# Patient Record
Sex: FEMALE | Race: WHITE | NOT HISPANIC OR LATINO
[De-identification: names, ages, dates, MRNs, and addresses within clinical notes are randomized per-mention and may not be internally consistent; named-entity substitution may affect disease eponyms.]

---

## 2020-03-13 PROBLEM — Z00.00 ENCOUNTER FOR PREVENTIVE HEALTH EXAMINATION: Status: ACTIVE | Noted: 2020-03-13

## 2020-03-19 ENCOUNTER — APPOINTMENT (OUTPATIENT)
Dept: SPINE | Facility: CLINIC | Age: 55
End: 2020-03-19

## 2020-04-02 ENCOUNTER — APPOINTMENT (OUTPATIENT)
Dept: SPINE | Facility: CLINIC | Age: 55
End: 2020-04-02

## 2020-12-14 ENCOUNTER — INPATIENT (INPATIENT)
Facility: HOSPITAL | Age: 55
LOS: 3 days | Discharge: HOME | End: 2020-12-18
Attending: INTERNAL MEDICINE | Admitting: INTERNAL MEDICINE
Payer: MEDICAID

## 2020-12-14 VITALS
OXYGEN SATURATION: 97 % | WEIGHT: 279.11 LBS | SYSTOLIC BLOOD PRESSURE: 135 MMHG | RESPIRATION RATE: 20 BRPM | TEMPERATURE: 98 F | HEART RATE: 135 BPM | DIASTOLIC BLOOD PRESSURE: 78 MMHG

## 2020-12-14 DIAGNOSIS — I26.99 OTHER PULMONARY EMBOLISM WITHOUT ACUTE COR PULMONALE: ICD-10-CM

## 2020-12-14 LAB
ALBUMIN SERPL ELPH-MCNC: 3.7 G/DL — SIGNIFICANT CHANGE UP (ref 3.5–5.2)
ALP SERPL-CCNC: 76 U/L — SIGNIFICANT CHANGE UP (ref 30–115)
ALT FLD-CCNC: 25 U/L — SIGNIFICANT CHANGE UP (ref 0–41)
ANION GAP SERPL CALC-SCNC: 12 MMOL/L — SIGNIFICANT CHANGE UP (ref 7–14)
AST SERPL-CCNC: 16 U/L — SIGNIFICANT CHANGE UP (ref 0–41)
BASOPHILS # BLD AUTO: 0.03 K/UL — SIGNIFICANT CHANGE UP (ref 0–0.2)
BASOPHILS NFR BLD AUTO: 0.3 % — SIGNIFICANT CHANGE UP (ref 0–1)
BILIRUB SERPL-MCNC: 1.7 MG/DL — HIGH (ref 0.2–1.2)
BUN SERPL-MCNC: 8 MG/DL — LOW (ref 10–20)
CALCIUM SERPL-MCNC: 9 MG/DL — SIGNIFICANT CHANGE UP (ref 8.5–10.1)
CHLORIDE SERPL-SCNC: 99 MMOL/L — SIGNIFICANT CHANGE UP (ref 98–110)
CO2 SERPL-SCNC: 25 MMOL/L — SIGNIFICANT CHANGE UP (ref 17–32)
CREAT SERPL-MCNC: 0.6 MG/DL — LOW (ref 0.7–1.5)
EOSINOPHIL # BLD AUTO: 0.03 K/UL — SIGNIFICANT CHANGE UP (ref 0–0.7)
EOSINOPHIL NFR BLD AUTO: 0.3 % — SIGNIFICANT CHANGE UP (ref 0–8)
GLUCOSE SERPL-MCNC: 97 MG/DL — SIGNIFICANT CHANGE UP (ref 70–99)
HCG SERPL QL: NEGATIVE — SIGNIFICANT CHANGE UP
HCT VFR BLD CALC: 38.6 % — SIGNIFICANT CHANGE UP (ref 37–47)
HGB BLD-MCNC: 12.6 G/DL — SIGNIFICANT CHANGE UP (ref 12–16)
IMM GRANULOCYTES NFR BLD AUTO: 0.4 % — HIGH (ref 0.1–0.3)
LIDOCAIN IGE QN: 28 U/L — SIGNIFICANT CHANGE UP (ref 7–60)
LYMPHOCYTES # BLD AUTO: 0.95 K/UL — LOW (ref 1.2–3.4)
LYMPHOCYTES # BLD AUTO: 9 % — LOW (ref 20.5–51.1)
MAGNESIUM SERPL-MCNC: 1.8 MG/DL — SIGNIFICANT CHANGE UP (ref 1.8–2.4)
MCHC RBC-ENTMCNC: 30.1 PG — SIGNIFICANT CHANGE UP (ref 27–31)
MCHC RBC-ENTMCNC: 32.6 G/DL — SIGNIFICANT CHANGE UP (ref 32–37)
MCV RBC AUTO: 92.3 FL — SIGNIFICANT CHANGE UP (ref 81–99)
MONOCYTES # BLD AUTO: 0.86 K/UL — HIGH (ref 0.1–0.6)
MONOCYTES NFR BLD AUTO: 8.2 % — SIGNIFICANT CHANGE UP (ref 1.7–9.3)
NEUTROPHILS # BLD AUTO: 8.63 K/UL — HIGH (ref 1.4–6.5)
NEUTROPHILS NFR BLD AUTO: 81.8 % — HIGH (ref 42.2–75.2)
NRBC # BLD: 0 /100 WBCS — SIGNIFICANT CHANGE UP (ref 0–0)
NT-PROBNP SERPL-SCNC: 39 PG/ML — SIGNIFICANT CHANGE UP (ref 0–300)
PLATELET # BLD AUTO: 174 K/UL — SIGNIFICANT CHANGE UP (ref 130–400)
POTASSIUM SERPL-MCNC: 4 MMOL/L — SIGNIFICANT CHANGE UP (ref 3.5–5)
POTASSIUM SERPL-SCNC: 4 MMOL/L — SIGNIFICANT CHANGE UP (ref 3.5–5)
PROT SERPL-MCNC: 6.1 G/DL — SIGNIFICANT CHANGE UP (ref 6–8)
RAPID RVP RESULT: SIGNIFICANT CHANGE UP
RBC # BLD: 4.18 M/UL — LOW (ref 4.2–5.4)
RBC # FLD: 14.4 % — SIGNIFICANT CHANGE UP (ref 11.5–14.5)
SARS-COV-2 RNA SPEC QL NAA+PROBE: SIGNIFICANT CHANGE UP
SODIUM SERPL-SCNC: 136 MMOL/L — SIGNIFICANT CHANGE UP (ref 135–146)
TROPONIN T SERPL-MCNC: <0.01 NG/ML — SIGNIFICANT CHANGE UP
WBC # BLD: 10.54 K/UL — SIGNIFICANT CHANGE UP (ref 4.8–10.8)
WBC # FLD AUTO: 10.54 K/UL — SIGNIFICANT CHANGE UP (ref 4.8–10.8)

## 2020-12-14 PROCEDURE — 71046 X-RAY EXAM CHEST 2 VIEWS: CPT | Mod: 26

## 2020-12-14 PROCEDURE — 99223 1ST HOSP IP/OBS HIGH 75: CPT | Mod: AI

## 2020-12-14 PROCEDURE — 71275 CT ANGIOGRAPHY CHEST: CPT | Mod: 26

## 2020-12-14 PROCEDURE — 93010 ELECTROCARDIOGRAM REPORT: CPT

## 2020-12-14 PROCEDURE — 99285 EMERGENCY DEPT VISIT HI MDM: CPT

## 2020-12-14 RX ORDER — IBUPROFEN 200 MG
600 TABLET ORAL ONCE
Refills: 0 | Status: COMPLETED | OUTPATIENT
Start: 2020-12-14 | End: 2020-12-14

## 2020-12-14 RX ORDER — ENOXAPARIN SODIUM 100 MG/ML
120 INJECTION SUBCUTANEOUS ONCE
Refills: 0 | Status: DISCONTINUED | OUTPATIENT
Start: 2020-12-14 | End: 2020-12-14

## 2020-12-14 RX ORDER — CHLORHEXIDINE GLUCONATE 213 G/1000ML
1 SOLUTION TOPICAL
Refills: 0 | Status: DISCONTINUED | OUTPATIENT
Start: 2020-12-14 | End: 2020-12-18

## 2020-12-14 RX ORDER — SODIUM CHLORIDE 9 MG/ML
1000 INJECTION, SOLUTION INTRAVENOUS ONCE
Refills: 0 | Status: COMPLETED | OUTPATIENT
Start: 2020-12-14 | End: 2020-12-14

## 2020-12-14 RX ORDER — ENOXAPARIN SODIUM 100 MG/ML
120 INJECTION SUBCUTANEOUS EVERY 12 HOURS
Refills: 0 | Status: DISCONTINUED | OUTPATIENT
Start: 2020-12-14 | End: 2020-12-15

## 2020-12-14 RX ORDER — FAMOTIDINE 10 MG/ML
20 INJECTION INTRAVENOUS DAILY
Refills: 0 | Status: DISCONTINUED | OUTPATIENT
Start: 2020-12-14 | End: 2020-12-18

## 2020-12-14 RX ADMIN — Medication 600 MILLIGRAM(S): at 20:00

## 2020-12-14 RX ADMIN — ENOXAPARIN SODIUM 120 MILLIGRAM(S): 100 INJECTION SUBCUTANEOUS at 21:50

## 2020-12-14 RX ADMIN — SODIUM CHLORIDE 1000 MILLILITER(S): 9 INJECTION, SOLUTION INTRAVENOUS at 16:57

## 2020-12-14 RX ADMIN — Medication 600 MILLIGRAM(S): at 19:22

## 2020-12-14 NOTE — ED ADULT NURSE NOTE - CHIEF COMPLAINT QUOTE
Chest pain since yesterday. Pt was recently dc from hospital with covid and pna, tested negative for covid 5 days ago. Only complaint is chest pain. -Romain Little [phone]

## 2020-12-14 NOTE — ED PROVIDER NOTE - NS ED ROS FT
Eyes:  No visual changes, eye pain or discharge.  ENMT:  No hearing changes, pain, no sore throat or runny nose, no difficulty swallowing  Cardiac:  +chest pain   Respiratory:  +cough  GI:  No nausea, vomiting, diarrhea or abdominal pain.  :  No dysuria, frequency or burning.  MS:  No myalgia, muscle weakness, joint pain or back pain.  Neuro:  No headache or weakness.  No LOC.  Skin:  No skin rash.   Endocrine: No history of thyroid disease or diabetes.

## 2020-12-14 NOTE — H&P ADULT - ATTENDING COMMENTS
54 YO F with a PMH of obesity, GERD, and recent COVID-19 and pneumonia discharged recently from [facility] (data not in HIE) who presents to the hosptial with a c/o sudden onset SOB that started this AM. Associated with CP that is described as located over her back, sharp, and worse with deep breaths. Denies any fevers/chills, LE swelling, palpitations, or N/V/D. Of note, when pt was discharged from [facility], pt was not started on ASA or AC. In the ED, CTA-chest showed acute B/L lower lobe segmental and subsegmental pulmonary emboli w/ no right heart strain. Started on therapeutic Lovenox in the ED.     Physical exam shows obese pt in NAD. Tachycardic (110's), afebrile, not hypoxic on RA. A&Ox3. Non-focal neuro exam. Muscle strength/sensation intact. CTA B/L with no W/C/R. RRR, no M/G/R. ABD is obese soft and non-tender, normoactive BSs. LEs without swelling. No rashes. Labs and radiology as above.     B/L segmental and subsegmental pulmonary embolisms, no right strain present; provoked. Obtain official echo. Started on therapeutic Lovenox in the ED, transition to NOAC in the AM. Serial cardiac enzymes. Monitor VSs. Supplemental O2 PRN.     HX of obesity and GERD. Restart home meds. DVT PPX. Inform PCP of pt's admission to hospital. My note supersedes the residents note.

## 2020-12-14 NOTE — H&P ADULT - ASSESSMENT
56 Yo F with PMHx significant for recent COVID-19 and pneumonia discharged recently from [facility] (data not in HIE) presented with complain of shortness of breath which started acutely and was accompanied with chest pain midsternal in location and radiating to the right arm. Patient tested positive for COVID-19 on 11/15/2020 and was hospitalized for ten days, received steroids and another medication (patient is not sure if it was Remdesivir) and was sent home on home oxygen. Patient was doing well until yesterday when she felt short of breath and developed non productive cough with chest pain radiating to right arm and back. Patient is not on any anticoagulation and was not sent home on any.  Patient denied any fevers, altered bowel habits, recent travels or headaches.         #Acute bilateral lower lobe segmental and subsegmental Pulmonary Embolism post COVID-19  -CT angio chest showed acute bilateral lower lobe segmental and subsegmental pulmonary emboli with No right heart strain. Patchy bilateral ground glass opacities, likely postinfectious or postinflammatory in etiology and small right pleural effusion was seen.   -EKG showed sinus tachycardia with non specific ST changes.  -BNP and troponin were negative.  -will start on Lovenox 1mg/kg bid for now, patient will need anticoagulation on discharge  -will order echocardiogram and LE duplex  -f/u troponin in am  -pain control prn    #Hx of GERD:  -C/W Pepcid     DIET: DASH  DVT PPX  GI PPX  CHG   AIT

## 2020-12-14 NOTE — ED PROVIDER NOTE - CLINICAL SUMMARY MEDICAL DECISION MAKING FREE TEXT BOX
pt found to have acute bilat PE, lovenox given, still tachy to 130s, admit to medicine tele for further work up/treatment. no heart strain on CTA.

## 2020-12-14 NOTE — ED ADULT NURSE NOTE - OBJECTIVE STATEMENT
Patient c.o intermittent midsternal c.p that radiates to right shoulder. As per patient pain comes on when she coughs. Patient recently had covid and pneumonia but swabbed negative 5 days ago. Denies sob or fever at this time.

## 2020-12-14 NOTE — H&P ADULT - HISTORY OF PRESENT ILLNESS
56 Yo F with PMHX significant for recent COVID-19 and pneumonia discharged recently (data not in HIE) after being swabbed negative presented with complain of shortness of breath which started acutely and was accompanied with chest pain midsternal in location and radiating to the right arm.  56 Yo F with PMHx significant for recent COVID-19 and pneumonia discharged recently from [facility] (data not in HIE) presented with complain of shortness of breath which started acutely and was accompanied with chest pain midsternal in location and radiating to the right arm. Patient tested positive for COVID-19 on 11/15/2020 and was hospitalized for ten days, received steroids and another medication (patient is not sure if it was Remdesivir) and was sent home on home oxygen. Patient  was doing well until yesterday when she felt short of breath and developed non productive cough with chest pain radiating to right arm and back. Patient is not on any anticoagulation and was not sent home on any.  Patient denied any fevers, altered bowel habits, recent travels or headaches.     Vital Signs (24 Hrs):  T(C): 37.1 (12-14-20 @ 16:30), Max: 37.1 (12-14-20 @ 16:30)  HR: 120 (12-14-20 @ 16:30) (120 - 135)  BP: 139/72 (12-14-20 @ 16:30) (135/78 - 139/72)  RR: 18 (12-14-20 @ 16:30) (18 - 20)  SpO2: 98% (12-14-20 @ 16:30) (97% - 98%)    Patient was received vitally stable in ER. Blood work was significant for lymphocytopenia and elevated bilirubin. BNP and troponin were negative. CT angio chest showed acute bilateral lower lobe segmental and subsegmental pulmonary emboli with No right heart strain. Patchy bilateral ground glass opacities, likely postinfectious or postinflammatory in etiology and small right pleural effusion was seen. EKG showed sinus tachycardia with non specific ST changes. Patient was admitted for initiating anticoagulation and monitoring.

## 2020-12-14 NOTE — ED ADULT NURSE NOTE - NSIMPLEMENTINTERV_GEN_ALL_ED
Implemented All Universal Safety Interventions:  Means to call system. Call bell, personal items and telephone within reach. Instruct patient to call for assistance. Room bathroom lighting operational. Non-slip footwear when patient is off stretcher. Physically safe environment: no spills, clutter or unnecessary equipment. Stretcher in lowest position, wheels locked, appropriate side rails in place.

## 2020-12-14 NOTE — ED PROVIDER NOTE - PHYSICAL EXAMINATION
November 14, 2019      [address] [facility] [address]  Phone: [phone]  Fax: [phone]       Patient: Gricelda Mora   YOB: 2002  Date of Visit: 11/14/2019    To Whom It May Concern:    Louisa Mora  was at [facility] on 11/14/2019. She may return to school with no restrictions. If you have any questions or concerns, or if I can be of further assistance, please do not hesitate to contact me.    Sincerely,  SAUL Saleh MA      CONSTITUTIONAL: Well-developed; well-nourished; in no acute distress.   SKIN: warm, dry  HEAD: Normocephalic; atraumatic.  EYES: no conjunctival injection. PERRL.   ENT: No nasal discharge; airway clear.  NECK: Supple; non tender.  CARD: Tachycardic  RESP: No wheezes, rales or rhonchi.  ABD: soft ntnd  EXT: Normal ROM.  No clubbing, cyanosis or edema.   LYMPH: No acute cervical adenopathy.  NEURO: Alert, oriented, grossly unremarkable  PSYCH: Cooperative, appropriate.

## 2020-12-14 NOTE — ED PROVIDER NOTE - OBJECTIVE STATEMENT
55y F pmh COVID-19 presenting with chest pain that began last night. Radiating to the book. Sharp stabbing, always there. No f/c. Pt tested positive for COVID late number warranting an ICU admission. Was never admitted. Not on blood thinners. No leg swelling. Non smoker. No malignancy. No orthopnea. Has not taken anything for the pain.

## 2020-12-14 NOTE — H&P ADULT - NSHPPHYSICALEXAM_GEN_ALL_CORE
PHYSICAL EXAM:  GENERAL: NAD, well-developed  HEAD:  Atraumatic, Normocephalic  EYES: conjunctiva and sclera clear  NECK: Supple, No JVD  CHEST/LUNG: Decreased breath sounds bilaterally; No wheeze  HEART: Regular rate and rhythm;   ABDOMEN: Soft, Nontender, Nondistended; Bowel sounds present  EXTREMITIES:  2+ Peripheral Pulses, No clubbing, cyanosis, or edema  PSYCH: AAOx3  NEUROLOGY: non-focal  SKIN: No rashes or lesions

## 2020-12-14 NOTE — ED ADULT TRIAGE NOTE - CHIEF COMPLAINT QUOTE
Chest pain since yesterday. Pt was recently dc from hospital with covid and pna, tested negative for covid 5 days ago. Only complaint is chest pain. -Romain Little [phone]

## 2020-12-14 NOTE — H&P ADULT - NSHPREVIEWOFSYSTEMS_GEN_ALL_CORE
CONSTITUTIONAL: No weakness, fevers or chills, no weight loss   EYES/ENT: No visual changes;  No vertigo or throat pain   NECK: No pain or stiffness  RESPIRATORY: +cough, + shortness of breath  CARDIOVASCULAR: +chest pain  GASTROINTESTINAL: No abdominal or epigastric pain. No nausea, vomiting, or hematemesis; No diarrhea or constipation. No melena or hematochezia.  GENITOURINARY: No discharge, hematuria  NEUROLOGICAL: No numbness or weakness  All other review of systems is negative unless indicated above.

## 2020-12-14 NOTE — H&P ADULT - NSHPLABSRESULTS_GEN_ALL_CORE
.  LABS:                         12.6   10.54 )-----------( 174      ( 14 Dec 2020 17:00 )             38.6     12-14    136  |  99  |  8<L>  ----------------------------<  97  4.0   |  25  |  0.6<L>    Ca    9.0      14 Dec 2020 17:00  Mg     1.8     12-14    TPro  6.1  /  Alb  3.7  /  TBili  1.7<H>  /  DBili  x   /  AST  16  /  ALT  25  /  AlkPhos  76  12-14        Serum Pro-Brain Natriuretic Peptide: 39 pg/mL (12-14 @ 17:00)        RADIOLOGY, EKG & ADDITIONAL TESTS: Reviewed.       < from: CT Angio Chest PE Protocol w/ IV Cont (12.14.20 @ 18:14) >    IMPRESSION:    1. Acute bilateral lower lobe segmental and subsegmental pulmonary emboli. No right heart strain.    2. Patchy bilateral ground glass opacities, likely postinfectious or postinflammatory in etiology.    3. Small right pleural effusion.

## 2020-12-14 NOTE — ED PROVIDER NOTE - ATTENDING CONTRIBUTION TO CARE
55F [facility] PE 11/2020 was admitted to ICU but never intubated dc home on O2 PRN, states covid neg on wednesday, p/w acute onset sharp substernal chest pain constant radiates to back, pleuritic. was not dc home on anticoag or ASA. seen by pmd today for symptoms and found to be tachycardic so sent to ED. no sob, palp, moreno. still coughing but no fever. no le edema, le pain, immobilization, hormones, hemoptysis.     on exam, AFVSS, well sarah nad, ncat, eomi, perrla, mmm, lctab, tachycardic 120s, rr nl s1s2 no mrg, abd soft ntnd, aaox3, no focal deficits, no le edema or calf ttp,     a/p; Concern for PE, will do labs, ekg/trop, CXR, CTA r/o PE, tele monitor re-eval

## 2020-12-15 LAB
ALBUMIN SERPL ELPH-MCNC: 3.3 G/DL — LOW (ref 3.5–5.2)
ALP SERPL-CCNC: 69 U/L — SIGNIFICANT CHANGE UP (ref 30–115)
ALT FLD-CCNC: 19 U/L — SIGNIFICANT CHANGE UP (ref 0–41)
ANION GAP SERPL CALC-SCNC: 10 MMOL/L — SIGNIFICANT CHANGE UP (ref 7–14)
AST SERPL-CCNC: 13 U/L — SIGNIFICANT CHANGE UP (ref 0–41)
BASOPHILS # BLD AUTO: 0.02 K/UL — SIGNIFICANT CHANGE UP (ref 0–0.2)
BASOPHILS NFR BLD AUTO: 0.3 % — SIGNIFICANT CHANGE UP (ref 0–1)
BILIRUB SERPL-MCNC: 2.7 MG/DL — HIGH (ref 0.2–1.2)
BUN SERPL-MCNC: 7 MG/DL — LOW (ref 10–20)
CALCIUM SERPL-MCNC: 9.1 MG/DL — SIGNIFICANT CHANGE UP (ref 8.5–10.1)
CHLORIDE SERPL-SCNC: 100 MMOL/L — SIGNIFICANT CHANGE UP (ref 98–110)
CO2 SERPL-SCNC: 27 MMOL/L — SIGNIFICANT CHANGE UP (ref 17–32)
CREAT SERPL-MCNC: 0.7 MG/DL — SIGNIFICANT CHANGE UP (ref 0.7–1.5)
D DIMER BLD IA.RAPID-MCNC: 675 NG/ML DDU — HIGH (ref 0–230)
EOSINOPHIL # BLD AUTO: 0.04 K/UL — SIGNIFICANT CHANGE UP (ref 0–0.7)
EOSINOPHIL NFR BLD AUTO: 0.5 % — SIGNIFICANT CHANGE UP (ref 0–8)
GLUCOSE SERPL-MCNC: 95 MG/DL — SIGNIFICANT CHANGE UP (ref 70–99)
HCT VFR BLD CALC: 35.3 % — LOW (ref 37–47)
HGB BLD-MCNC: 11.7 G/DL — LOW (ref 12–16)
IMM GRANULOCYTES NFR BLD AUTO: 0.3 % — SIGNIFICANT CHANGE UP (ref 0.1–0.3)
INR BLD: 1.35 RATIO — HIGH (ref 0.65–1.3)
LYMPHOCYTES # BLD AUTO: 1.13 K/UL — LOW (ref 1.2–3.4)
LYMPHOCYTES # BLD AUTO: 14.9 % — LOW (ref 20.5–51.1)
MAGNESIUM SERPL-MCNC: 1.9 MG/DL — SIGNIFICANT CHANGE UP (ref 1.8–2.4)
MCHC RBC-ENTMCNC: 30.4 PG — SIGNIFICANT CHANGE UP (ref 27–31)
MCHC RBC-ENTMCNC: 33.1 G/DL — SIGNIFICANT CHANGE UP (ref 32–37)
MCV RBC AUTO: 91.7 FL — SIGNIFICANT CHANGE UP (ref 81–99)
MONOCYTES # BLD AUTO: 0.79 K/UL — HIGH (ref 0.1–0.6)
MONOCYTES NFR BLD AUTO: 10.4 % — HIGH (ref 1.7–9.3)
NEUTROPHILS # BLD AUTO: 5.59 K/UL — SIGNIFICANT CHANGE UP (ref 1.4–6.5)
NEUTROPHILS NFR BLD AUTO: 73.6 % — SIGNIFICANT CHANGE UP (ref 42.2–75.2)
NRBC # BLD: 0 /100 WBCS — SIGNIFICANT CHANGE UP (ref 0–0)
PLATELET # BLD AUTO: 154 K/UL — SIGNIFICANT CHANGE UP (ref 130–400)
POTASSIUM SERPL-MCNC: 4.3 MMOL/L — SIGNIFICANT CHANGE UP (ref 3.5–5)
POTASSIUM SERPL-SCNC: 4.3 MMOL/L — SIGNIFICANT CHANGE UP (ref 3.5–5)
PROT SERPL-MCNC: 6.1 G/DL — SIGNIFICANT CHANGE UP (ref 6–8)
PROTHROM AB SERPL-ACNC: 15.5 SEC — HIGH (ref 9.95–12.87)
RBC # BLD: 3.85 M/UL — LOW (ref 4.2–5.4)
RBC # FLD: 14.4 % — SIGNIFICANT CHANGE UP (ref 11.5–14.5)
SODIUM SERPL-SCNC: 137 MMOL/L — SIGNIFICANT CHANGE UP (ref 135–146)
TROPONIN T SERPL-MCNC: <0.01 NG/ML — SIGNIFICANT CHANGE UP
WBC # BLD: 7.59 K/UL — SIGNIFICANT CHANGE UP (ref 4.8–10.8)
WBC # FLD AUTO: 7.59 K/UL — SIGNIFICANT CHANGE UP (ref 4.8–10.8)

## 2020-12-15 PROCEDURE — 93306 TTE W/DOPPLER COMPLETE: CPT | Mod: 26

## 2020-12-15 PROCEDURE — 99233 SBSQ HOSP IP/OBS HIGH 50: CPT

## 2020-12-15 PROCEDURE — 93970 EXTREMITY STUDY: CPT | Mod: 26

## 2020-12-15 RX ORDER — FAMOTIDINE 10 MG/ML
20 INJECTION INTRAVENOUS DAILY
Refills: 0 | Status: COMPLETED | OUTPATIENT
Start: 2020-12-15 | End: 2020-12-15

## 2020-12-15 RX ORDER — CLOTRIMAZOLE AND BETAMETHASONE DIPROPIONATE 10; .5 MG/G; MG/G
1 CREAM TOPICAL
Refills: 0 | Status: DISCONTINUED | OUTPATIENT
Start: 2020-12-15 | End: 2020-12-15

## 2020-12-15 RX ORDER — APIXABAN 2.5 MG/1
1 TABLET, FILM COATED ORAL
Qty: 0 | Refills: 0 | DISCHARGE
Start: 2020-12-15 | End: 2021-01-26

## 2020-12-15 RX ORDER — ACETAMINOPHEN 500 MG
650 TABLET ORAL ONCE
Refills: 0 | Status: COMPLETED | OUTPATIENT
Start: 2020-12-15 | End: 2020-12-15

## 2020-12-15 RX ORDER — RIVAROXABAN 15 MG-20MG
15 KIT ORAL
Refills: 0 | Status: DISCONTINUED | OUTPATIENT
Start: 2020-12-15 | End: 2020-12-18

## 2020-12-15 RX ORDER — FONDAPARINUX SODIUM 2.5 MG/.5ML
1 INJECTION, SOLUTION SUBCUTANEOUS
Qty: 0 | Refills: 0 | DISCHARGE
Start: 2020-12-15 | End: 2021-01-05

## 2020-12-15 RX ORDER — INFLUENZA VIRUS VACCINE 15; 15; 15; 15 UG/.5ML; UG/.5ML; UG/.5ML; UG/.5ML
0.5 SUSPENSION INTRAMUSCULAR ONCE
Refills: 0 | Status: DISCONTINUED | OUTPATIENT
Start: 2020-12-15 | End: 2020-12-18

## 2020-12-15 RX ADMIN — Medication 1 APPLICATION(S): at 17:16

## 2020-12-15 RX ADMIN — Medication 650 MILLIGRAM(S): at 12:28

## 2020-12-15 RX ADMIN — ENOXAPARIN SODIUM 120 MILLIGRAM(S): 100 INJECTION SUBCUTANEOUS at 06:04

## 2020-12-15 RX ADMIN — Medication 600 MILLIGRAM(S): at 17:15

## 2020-12-15 RX ADMIN — RIVAROXABAN 15 MILLIGRAM(S): KIT at 17:31

## 2020-12-15 RX ADMIN — Medication 600 MILLIGRAM(S): at 06:04

## 2020-12-15 RX ADMIN — FAMOTIDINE 20 MILLIGRAM(S): 10 INJECTION INTRAVENOUS at 00:46

## 2020-12-15 RX ADMIN — FAMOTIDINE 20 MILLIGRAM(S): 10 INJECTION INTRAVENOUS at 12:44

## 2020-12-15 RX ADMIN — Medication 650 MILLIGRAM(S): at 14:15

## 2020-12-15 RX ADMIN — Medication 650 MILLIGRAM(S): at 22:27

## 2020-12-15 RX ADMIN — Medication 650 MILLIGRAM(S): at 21:27

## 2020-12-15 NOTE — PROGRESS NOTE ADULT - SUBJECTIVE AND OBJECTIVE BOX
KAREL CORTÉS  55y Female    CHIEF COMPLAINT:    Patient is a 55y old  Female who presents with a chief complaint of Shortness of breath (14 Dec 2020 20:16)      INTERVAL HPI/OVERNIGHT EVENTS:    Patient seen and examined.    ROS: All other systems are negative.    Vital Signs:    T(F): 98.2 (12-15-20 @ 04:00), Max: 99.3 (12-14-20 @ 21:26)  HR: 107 (12-15-20 @ 04:00) (87 - 135)  BP: 154/77 (12-15-20 @ 04:00) (94/52 - 154/77)  RR: 18 (12-15-20 @ 04:00) (18 - 20)  SpO2: 98% (12-15-20 @ 01:36) (96% - 98%)  I&O's Summary    Daily Height in cm: 180.34 (15 Dec 2020 04:00)    Daily   CAPILLARY BLOOD GLUCOSE          PHYSICAL EXAM:    GENERAL:  NAD  SKIN: No rashes or lesions  HENT: Atrumatic. Normocephalic. PERRL. Moist membranes.  NECK: Supple, No JVD. No lymphadenopathy.  PULMONARY: CTA B/L. No wheezing. No rales  CVS: Normal S1, S2. Rate and Rythm are regular. No murmurs.  ABDOMEN/GI: Soft, Nontender, Nondistended; BS present  EXTREMITIES: Peripheral pulses intact. No edema B/L LE.  NEUROLOGIC:  No motor or sensory deficit.  PSYCH: Alert & oriented x 3    Consultant(s) Notes Reviewed:  [x ] YES  [ ] NO  Care Discussed with Consultants/Other Providers [ x] YES  [ ] NO    EKG reviewed  Telemetry reviewed    LABS:                        12.6   10.54 )-----------( 174      ( 14 Dec 2020 17:00 )             38.6     12-14    136  |  99  |  8<L>  ----------------------------<  97  4.0   |  25  |  0.6<L>    Ca    9.0      14 Dec 2020 17:00  Mg     1.8     12-14    TPro  6.1  /  Alb  3.7  /  TBili  1.7<H>  /  DBili  x   /  AST  16  /  ALT  25  /  AlkPhos  76  12-14      Serum Pro-Brain Natriuretic Peptide: 39 pg/mL (12-14-20 @ 17:00)    Trop <0.01, CKMB --, CK --, 12-14-20 @ 17:00        RADIOLOGY & ADDITIONAL TESTS:    < from: CT Angio Chest PE Protocol w/ IV Cont (12.14.20 @ 18:14) >  IMPRESSION:    1. Acute bilateral lower lobe segmental and subsegmental pulmonary emboli. No right heart strain.    2. Patchy bilateral ground glass opacities, likely postinfectious or postinflammatory in etiology.    3. Small right pleural effusion.      < end of copied text >    Imaging or report Personally Reviewed:  [ ] YES  [ ] NO    Medications:  Standing  chlorhexidine 4% Liquid 1 Application(s) Topical <User Schedule>  enoxaparin Injectable 120 milliGRAM(s) SubCutaneous every 12 hours  famotidine    Tablet 20 milliGRAM(s) Oral daily  guaiFENesin  milliGRAM(s) Oral every 12 hours  influenza   Vaccine 0.5 milliLiter(s) IntraMuscular once    PRN Meds      Case discussed with resident    Care discussed with pt/family           KAREL CORTÉS  55y Female    CHIEF COMPLAINT:    Patient is a 55y old  Female who presents with a chief complaint of Shortness of breath (14 Dec 2020 20:16)      INTERVAL HPI/OVERNIGHT EVENTS:    Patient seen and examined. Spiking temp. C/O R sided back pain which gets worse on coughing. Also having dry cough and 100.9 temp. No sob.     ROS: All other systems are negative.    Vital Signs:    T(F): 98.2 (12-15-20 @ 04:00), Max: 99.3 (12-14-20 @ 21:26)  HR: 107 (12-15-20 @ 04:00) (87 - 135)  BP: 154/77 (12-15-20 @ 04:00) (94/52 - 154/77)  RR: 18 (12-15-20 @ 04:00) (18 - 20)  SpO2: 98% (12-15-20 @ 01:36) (96% - 98%)  I&O's Summary    Daily Height in cm: 180.34 (15 Dec 2020 04:00)    Daily   CAPILLARY BLOOD GLUCOSE          PHYSICAL EXAM:    GENERAL:  NAD  SKIN: No rashes or lesions  HENT: Atraumatic Normocephalic. PERRL. Moist membranes.  NECK: Supple, No JVD. No lymphadenopathy.  PULMONARY: CTA B/L. No wheezing. No rales  CVS: Normal S1, S2. Rate and Rhythm are regular. No murmurs.  ABDOMEN/GI: Soft, Nontender, Nondistended; BS present  EXTREMITIES: Peripheral pulses intact. No edema B/L LE.  NEUROLOGIC:  No motor or sensory deficit.  PSYCH: Alert & oriented x 3    Consultant(s) Notes Reviewed:  [x ] YES  [ ] NO  Care Discussed with Consultants/Other Providers [ x] YES  [ ] NO    EKG reviewed  Telemetry reviewed    LABS:                        12.6   10.54 )-----------( 174      ( 14 Dec 2020 17:00 )             38.6     12-14    136  |  99  |  8<L>  ----------------------------<  97  4.0   |  25  |  0.6<L>    Ca    9.0      14 Dec 2020 17:00  Mg     1.8     12-14    TPro  6.1  /  Alb  3.7  /  TBili  1.7<H>  /  DBili  x   /  AST  16  /  ALT  25  /  AlkPhos  76  12-14      Serum Pro-Brain Natriuretic Peptide: 39 pg/mL (12-14-20 @ 17:00)    Trop <0.01, CKMB --, CK --, 12-14-20 @ 17:00        RADIOLOGY & ADDITIONAL TESTS:    < from: CT Angio Chest PE Protocol w/ IV Cont (12.14.20 @ 18:14) >  IMPRESSION:    1. Acute bilateral lower lobe segmental and subsegmental pulmonary emboli. No right heart strain.    2. Patchy bilateral ground glass opacities, likely postinfectious or postinflammatory in etiology.    3. Small right pleural effusion.      < end of copied text >  e< from: TTE Echo Complete w/o Contrast w/ Doppler (12.15.20 @ 08:27) >    Summary:   1. LV Ejection Fraction by Saucedo's Method with a biplane EF of 58 %.   2. Normal left ventricular size and wall thicknesses, with normal systolic and diastolic function.   3. Normal left atrial size.   4. Normal right atrial size.   5. No evidence of mitral valve regurgitation.   6. Mild tricuspid regurgitation.   7. LA volume Index is 13.4 ml/m² ml/m2.   8. Peak transaortic gradient equals 10.0 mmHg, mean transaortic gradient equals 5.0 mmHg, the calculated aortic valve area equals 2.28 cm² by the continuity equation consistent with mild aortic stenosis.    < end of copied text >    Imaging or report Personally Reviewed:  [ ] YES  [ ] NO    Medications:  Standing  chlorhexidine 4% Liquid 1 Application(s) Topical <User Schedule>  enoxaparin Injectable 120 milliGRAM(s) SubCutaneous every 12 hours  famotidine    Tablet 20 milliGRAM(s) Oral daily  guaiFENesin  milliGRAM(s) Oral every 12 hours  influenza   Vaccine 0.5 milliLiter(s) IntraMuscular once    PRN Meds      Case discussed with resident    Care discussed with pt/family

## 2020-12-15 NOTE — PROGRESS NOTE ADULT - ASSESSMENT
56 Yo F with PMHx significant for recent COVID-19 and pneumonia discharged recently from [facility] (data not in HIE) presented with complain of shortness of breath which started acutely and was accompanied with chest pain midsternal in location and radiating to the right arm. Patient tested positive for COVID-19 on 11/15/2020 and was hospitalized for ten days, received steroids and another medication (patient is not sure if it was Remdesivir) and was sent home on home oxygen. Patient was doing well until yesterday when she felt short of breath and developed non productive cough with chest pain radiating to right arm and back. Patient is not on any anticoagulation and was not sent home on any.  Patient denied any fevers, altered bowel habits, recent travels or headaches.     #Acute bilateral lower lobe segmental and subsegmental Pulmonary Embolism post COVID-19  -CT angio chest showed acute bilateral lower lobe segmental and subsegmental pulmonary emboli with No right heart strain. Patchy bilateral ground glass opacities, likely postinfectious or postinflammatory in etiology and small right pleural effusion was seen.   -EKG showed sinus tachycardia with non specific ST changes.  -BNP and troponin were negative.  -will start on Lovenox 1mg/kg bid for now, patient will need anticoagulation on discharge  -will order echocardiogram and LE duplex  -f/u troponin in am  -pain control prn    #Hx of GERD:  -C/W Pepcid     #Right Breast Rash #Fungal Infection on admission #Fever likely secondary to rash  - Took Clotrimazole cream 1% at home for relief  - Restart in hospital   - Wash with water and wet-->dry cloth    DIET: DASH  DVT PPX  GI PPX  CHG   AIT   56 Yo F with PMHx significant for recent COVID-19 and pneumonia discharged recently from [facility] (data not in HIE) presented with complain of shortness of breath which started acutely and was accompanied with chest pain midsternal in location and radiating to the right arm. Patient tested positive for COVID-19 on 11/15/2020 and was hospitalized for ten days, received steroids and another medication (patient is not sure if it was Remdesivir) and was sent home on home oxygen. Patient was doing well until yesterday when she felt short of breath and developed non productive cough with chest pain radiating to right arm and back. Patient is not on any anticoagulation and was not sent home on any.  Patient denied any fevers, altered bowel habits, recent travels or headaches.     #Acute bilateral lower lobe segmental and subsegmental Pulmonary Embolism post COVID-19  -CT angio chest showed acute bilateral lower lobe segmental and subsegmental pulmonary emboli with No right heart strain. Patchy bilateral ground glass opacities, likely postinfectious or postinflammatory in etiology and small right pleural effusion was seen.   -EKG showed sinus tachycardia with non specific ST changes.  -BNP and troponin were negative.  -will start on Lovenox 1mg/kg bid for now, patient will need anticoagulation on discharge  -will order echocardiogram and LE duplex  -f/u troponin in am  -pain control prn    #Hx of GERD:  -C/W Pepcid     #Right Breast Rash #Fungal Infection on admission   - Took Clotrimazole cream 1% at home for relief  - Restart in hospital   - Wash with water and wet-->dry cloth  - Blood cultures sent    #Fever secondary to PE vs Infection  -Prolonged hospital stay s/p covid infection at [facility]   -Possible nosocomial infection  -Fever yesterday to 101.7F  -Blood cultures sent last night  -f/u blood cultures, procal      DIET: DASH  DVT PPX  GI PPX  CHG   AIT  Dispo: Acute

## 2020-12-15 NOTE — PROGRESS NOTE ADULT - SUBJECTIVE AND OBJECTIVE BOX
Ptn seen and examined at bedside. No acute events overnight. Denies any ongoing chest pain/ SOB / discomfort. Complaining of R-breast rash, present since before admission treated at home by ptn's daughter. Itchy, red rash ptn describes as slowly improving over the last 1 week. Otherwise no complaints.      PAST MEDICAL & SURGICAL HISTORY  COVID-19      ALLERGIES:  Allergy Status Unknown    MEDICATIONS:  STANDING MEDICATIONS  chlorhexidine 4% Liquid 1 Application(s) Topical <User Schedule>  clotrimazole 1% Cream 1 Application(s) Topical two times a day  famotidine    Tablet 20 milliGRAM(s) Oral daily  guaiFENesin  milliGRAM(s) Oral every 12 hours  influenza   Vaccine 0.5 milliLiter(s) IntraMuscular once  rivaroxaban 15 milliGRAM(s) Oral two times a day with meals    PRN MEDICATIONS    VITALS:   T(F): 100.9  HR: 121  BP: 125/58  RR: 18  SpO2: 98%    LABS:                        11.7   7.59  )-----------( 154      ( 15 Dec 2020 04:30 )             35.3     12-15    137  |  100  |  7<L>  ----------------------------<  95  4.3   |  27  |  0.7    Ca    9.1      15 Dec 2020 04:30  Mg     1.9     12-15    TPro  6.1  /  Alb  3.3<L>  /  TBili  2.7<H>  /  DBili  x   /  AST  13  /  ALT  19  /  AlkPhos  69  12-15    PT/INR - ( 15 Dec 2020 04:30 )   PT: 15.50 sec;   INR: 1.35 ratio               Troponin T, Serum: <0.01 ng/mL (12-15-20 @ 04:30)  Troponin T, Serum: <0.01 ng/mL (12-14-20 @ 17:00)      CARDIAC MARKERS ( 15 Dec 2020 04:30 )  x     / <0.01 ng/mL / x     / x     / x      CARDIAC MARKERS ( 14 Dec 2020 17:00 )  x     / <0.01 ng/mL / x     / x     / x          RADIOLOGY:< from: CT Angio Chest PE Protocol w/ IV Cont (12.14.20 @ 18:14) >    EXAM:  CT ANGIO CHEST PE PROTOCOL IC            PROCEDURE DATE:  12/14/2020            INTERPRETATION:  CLINICAL INDICATION: Pulmonary embolism    TECHNIQUE:  CTA of the thorax was performed after administration of contrast per the PE protocol. Sagittal and coronal reformats were performed as well as 3D reconstructions.  Intravenous contrast: 100 cc of Optiray 320    COMPARISON: None.    INTERPRETATION:    PULMONARY ARTERIES: Acute bilateral lower lobe segmental and subsegmental pulmonary emboli.    AIRWAYS/LUNGS/PLEURA: The central tracheobronchial tree is patent.  Patchy bilateral ground glass opacities. Right lung base atelectasis. There is a small right pleural effusion. There is no pneumothorax.    MEDIASTINUM: There are no enlarged mediastinal, hilar or axillary lymph nodes. The visualized portion of the thyroid gland is unremarkable.    HEART AND VESSELS: The heart is normal in size. No right heart strain. Normal size of the thoracic aorta There is no pericardial effusion.    UPPER ABDOMEN: Images of the upper abdomen demonstrate no abnormality.    BONES AND SOFT TISSUES: There are degenerative changes of the spine.  The soft tissues are unremarkable.    TUBES AND LINES: None.    IMPRESSION:    1. Acute bilateral lower lobe segmental and subsegmental pulmonary emboli. No right heart strain.    2. Patchy bilateral ground glass opacities, likely postinfectious or postinflammatory in etiology.    3. Small right pleural effusion.        Spoke with CAIT SNYDER on 12/14/2020 6:34PM with readback.              MARTHA SHIN MD; Attending Radiologist  This document has been electronically signed. Dec 14 2020  6:36PM    < end of copied text >      PHYSICAL EXAM:  GEN: No acute distress obese female  LUNGS: Clear to auscultation bilaterally, no decreased breath sounds at bases  HEART: Regular, no murmurs/ rubs. No JVD.  CHEST: R-sided breast rash extending from inferior areola to Right upper abdominal quadrant, no discharge. No tenderness to palpation.  ABD: Soft, non-tender, non-distended.  EXT: NC/NC/NE/2+PP/SALAZAR/Skin Intact.   NEURO: AAOX3

## 2020-12-15 NOTE — PROGRESS NOTE ADULT - ASSESSMENT
56 Yo F with PMHx significant for recent COVID-19 and pneumonia discharged recently from [facility] (data not in HIE) presented with complain of shortness of breath which started acutely and was accompanied with chest pain midsternal in location and radiating to the right arm. Patient tested positive for COVID-19 on 11/15/2020 and was hospitalized for ten days 56 Yo F with PMHx significant for recent COVID-19 and pneumonia discharged recently from [facility] (data not in HIE) presented with complain of shortness of breath which started acutely and was accompanied with chest pain midsternal in location and radiating to the right arm. Patient tested positive for COVID-19 on 11/15/2020 and was hospitalized for ten days      Acute B/L PE post covid  Obesity  Fever.          PLAN:    ·	Can switch her to Xarelto 15 mg po q 12h for 3 weeks, then 20 mg po daily  ·	ECHO showed EF is 58%. No RV strain  ·	Pulmonary eval  ·	Check procalcitonin  ·	Check venous doppler of LE  ·	Dietary eval

## 2020-12-16 ENCOUNTER — TRANSCRIPTION ENCOUNTER (OUTPATIENT)
Age: 55
End: 2020-12-16

## 2020-12-16 LAB
ANION GAP SERPL CALC-SCNC: 10 MMOL/L — SIGNIFICANT CHANGE UP (ref 7–14)
APPEARANCE UR: CLEAR — SIGNIFICANT CHANGE UP
BACTERIA # UR AUTO: ABNORMAL
BASOPHILS # BLD AUTO: 0.02 K/UL — SIGNIFICANT CHANGE UP (ref 0–0.2)
BASOPHILS NFR BLD AUTO: 0.3 % — SIGNIFICANT CHANGE UP (ref 0–1)
BILIRUB UR-MCNC: NEGATIVE — SIGNIFICANT CHANGE UP
BUN SERPL-MCNC: 9 MG/DL — LOW (ref 10–20)
CALCIUM SERPL-MCNC: 8.8 MG/DL — SIGNIFICANT CHANGE UP (ref 8.5–10.1)
CHLORIDE SERPL-SCNC: 100 MMOL/L — SIGNIFICANT CHANGE UP (ref 98–110)
CO2 SERPL-SCNC: 26 MMOL/L — SIGNIFICANT CHANGE UP (ref 17–32)
COLOR SPEC: YELLOW — SIGNIFICANT CHANGE UP
CREAT SERPL-MCNC: 0.8 MG/DL — SIGNIFICANT CHANGE UP (ref 0.7–1.5)
DIFF PNL FLD: SIGNIFICANT CHANGE UP
EOSINOPHIL # BLD AUTO: 0.04 K/UL — SIGNIFICANT CHANGE UP (ref 0–0.7)
EOSINOPHIL NFR BLD AUTO: 0.5 % — SIGNIFICANT CHANGE UP (ref 0–8)
EPI CELLS # UR: 2 /HPF — SIGNIFICANT CHANGE UP (ref 0–5)
GLUCOSE SERPL-MCNC: 139 MG/DL — HIGH (ref 70–99)
GLUCOSE UR QL: NEGATIVE — SIGNIFICANT CHANGE UP
HCT VFR BLD CALC: 34.5 % — LOW (ref 37–47)
HGB BLD-MCNC: 11.2 G/DL — LOW (ref 12–16)
HYALINE CASTS # UR AUTO: 1 /LPF — SIGNIFICANT CHANGE UP (ref 0–7)
IMM GRANULOCYTES NFR BLD AUTO: 0.4 % — HIGH (ref 0.1–0.3)
KETONES UR-MCNC: NEGATIVE — SIGNIFICANT CHANGE UP
LEUKOCYTE ESTERASE UR-ACNC: ABNORMAL
LYMPHOCYTES # BLD AUTO: 1.23 K/UL — SIGNIFICANT CHANGE UP (ref 1.2–3.4)
LYMPHOCYTES # BLD AUTO: 15.6 % — LOW (ref 20.5–51.1)
MAGNESIUM SERPL-MCNC: 1.8 MG/DL — SIGNIFICANT CHANGE UP (ref 1.8–2.4)
MCHC RBC-ENTMCNC: 30.1 PG — SIGNIFICANT CHANGE UP (ref 27–31)
MCHC RBC-ENTMCNC: 32.5 G/DL — SIGNIFICANT CHANGE UP (ref 32–37)
MCV RBC AUTO: 92.7 FL — SIGNIFICANT CHANGE UP (ref 81–99)
MONOCYTES # BLD AUTO: 0.56 K/UL — SIGNIFICANT CHANGE UP (ref 0.1–0.6)
MONOCYTES NFR BLD AUTO: 7.1 % — SIGNIFICANT CHANGE UP (ref 1.7–9.3)
MRSA PCR RESULT.: NEGATIVE — SIGNIFICANT CHANGE UP
NEUTROPHILS # BLD AUTO: 6 K/UL — SIGNIFICANT CHANGE UP (ref 1.4–6.5)
NEUTROPHILS NFR BLD AUTO: 76.1 % — HIGH (ref 42.2–75.2)
NITRITE UR-MCNC: POSITIVE
NRBC # BLD: 0 /100 WBCS — SIGNIFICANT CHANGE UP (ref 0–0)
PH UR: 6.5 — SIGNIFICANT CHANGE UP (ref 5–8)
PLATELET # BLD AUTO: 203 K/UL — SIGNIFICANT CHANGE UP (ref 130–400)
POTASSIUM SERPL-MCNC: 4.1 MMOL/L — SIGNIFICANT CHANGE UP (ref 3.5–5)
POTASSIUM SERPL-SCNC: 4.1 MMOL/L — SIGNIFICANT CHANGE UP (ref 3.5–5)
PROCALCITONIN SERPL-MCNC: 0.33 NG/ML — HIGH (ref 0.02–0.1)
PROT UR-MCNC: SIGNIFICANT CHANGE UP
RBC # BLD: 3.72 M/UL — LOW (ref 4.2–5.4)
RBC # FLD: 14.2 % — SIGNIFICANT CHANGE UP (ref 11.5–14.5)
RBC CASTS # UR COMP ASSIST: 2 /HPF — SIGNIFICANT CHANGE UP (ref 0–4)
SODIUM SERPL-SCNC: 136 MMOL/L — SIGNIFICANT CHANGE UP (ref 135–146)
SP GR SPEC: 1.01 — SIGNIFICANT CHANGE UP (ref 1.01–1.03)
UROBILINOGEN FLD QL: SIGNIFICANT CHANGE UP
WBC # BLD: 7.88 K/UL — SIGNIFICANT CHANGE UP (ref 4.8–10.8)
WBC # FLD AUTO: 7.88 K/UL — SIGNIFICANT CHANGE UP (ref 4.8–10.8)
WBC UR QL: 16 /HPF — HIGH (ref 0–5)

## 2020-12-16 PROCEDURE — 93010 ELECTROCARDIOGRAM REPORT: CPT

## 2020-12-16 PROCEDURE — 71045 X-RAY EXAM CHEST 1 VIEW: CPT | Mod: 26

## 2020-12-16 PROCEDURE — 99233 SBSQ HOSP IP/OBS HIGH 50: CPT

## 2020-12-16 RX ORDER — FONDAPARINUX SODIUM 2.5 MG/.5ML
1 INJECTION, SOLUTION SUBCUTANEOUS
Qty: 42 | Refills: 0
Start: 2020-12-16

## 2020-12-16 RX ORDER — FONDAPARINUX SODIUM 2.5 MG/.5ML
1 INJECTION, SOLUTION SUBCUTANEOUS
Qty: 2 | Refills: 0
Start: 2020-12-16

## 2020-12-16 RX ORDER — ACETAMINOPHEN 500 MG
650 TABLET ORAL EVERY 6 HOURS
Refills: 0 | Status: DISCONTINUED | OUTPATIENT
Start: 2020-12-16 | End: 2020-12-18

## 2020-12-16 RX ORDER — VANCOMYCIN HCL 1 G
1250 VIAL (EA) INTRAVENOUS EVERY 12 HOURS
Refills: 0 | Status: DISCONTINUED | OUTPATIENT
Start: 2020-12-16 | End: 2020-12-17

## 2020-12-16 RX ORDER — METOPROLOL TARTRATE 50 MG
5 TABLET ORAL ONCE
Refills: 0 | Status: DISCONTINUED | OUTPATIENT
Start: 2020-12-16 | End: 2020-12-16

## 2020-12-16 RX ORDER — CEFEPIME 1 G/1
1000 INJECTION, POWDER, FOR SOLUTION INTRAMUSCULAR; INTRAVENOUS EVERY 8 HOURS
Refills: 0 | Status: DISCONTINUED | OUTPATIENT
Start: 2020-12-16 | End: 2020-12-17

## 2020-12-16 RX ADMIN — Medication 600 MILLIGRAM(S): at 17:09

## 2020-12-16 RX ADMIN — RIVAROXABAN 15 MILLIGRAM(S): KIT at 17:09

## 2020-12-16 RX ADMIN — Medication 650 MILLIGRAM(S): at 17:21

## 2020-12-16 RX ADMIN — Medication 1 APPLICATION(S): at 17:10

## 2020-12-16 RX ADMIN — CEFEPIME 100 MILLIGRAM(S): 1 INJECTION, POWDER, FOR SOLUTION INTRAMUSCULAR; INTRAVENOUS at 21:22

## 2020-12-16 RX ADMIN — Medication 600 MILLIGRAM(S): at 05:36

## 2020-12-16 RX ADMIN — CEFEPIME 100 MILLIGRAM(S): 1 INJECTION, POWDER, FOR SOLUTION INTRAMUSCULAR; INTRAVENOUS at 16:24

## 2020-12-16 RX ADMIN — FAMOTIDINE 20 MILLIGRAM(S): 10 INJECTION INTRAVENOUS at 10:53

## 2020-12-16 RX ADMIN — Medication 1 APPLICATION(S): at 05:36

## 2020-12-16 RX ADMIN — Medication 650 MILLIGRAM(S): at 20:01

## 2020-12-16 RX ADMIN — RIVAROXABAN 15 MILLIGRAM(S): KIT at 08:55

## 2020-12-16 RX ADMIN — Medication 166.67 MILLIGRAM(S): at 16:24

## 2020-12-16 RX ADMIN — CHLORHEXIDINE GLUCONATE 1 APPLICATION(S): 213 SOLUTION TOPICAL at 05:36

## 2020-12-16 RX ADMIN — Medication 650 MILLIGRAM(S): at 08:54

## 2020-12-16 RX ADMIN — Medication 650 MILLIGRAM(S): at 11:40

## 2020-12-16 NOTE — DISCHARGE NOTE PROVIDER - NSDCMRMEDTOKEN_GEN_ALL_CORE_FT
clotrimazole 1% topical cream: 1 application topically 2 times a day  Mucinex 600 mg oral tablet, extended release: 1 tab(s) orally every 12 hours  Pepcid 20 mg oral tablet: 1 tab(s) orally once a day  Xarelto 15 mg oral tablet: 1 tab(s) orally 2 times a day, once with breakfast and once with dinner for 3 weeks.  Xarelto 20 mg oral tablet: 1 tab(s) orally once a day    clotrimazole 1% topical cream: 1 application topically 2 times a day  loratadine 10 mg oral tablet: 1 tab(s) orally once a day for three days.   Mucinex 600 mg oral tablet, extended release: 1 tab(s) orally every 12 hours  Pepcid 20 mg oral tablet: 1 tab(s) orally once a day  Xarelto 15 mg oral tablet: 1 tab(s) orally 2 times a day, once with breakfast and once with dinner for 3 weeks until 1/5/2021  Xarelto 20 mg oral tablet: 1 tab(s) orally once a day

## 2020-12-16 NOTE — DISCHARGE NOTE PROVIDER - NSFOLLOWUPCLINICS_GEN_ALL_ED_FT
[facility]  Pulmonary Rehab  [address] [place] [address]  Phone: [phone]  Fax:   Follow Up Time: 1 week

## 2020-12-16 NOTE — CONSULT NOTE ADULT - ASSESSMENT
impression:      Acute PE sPESI 1(intermediate risk) major transient risk factor(recent hospitalisation/COVID).  recent SARS-COV-2 penumonia requiring hospitalisation.    Plan:    Continue xarelto for 3 months  will need hypercoag workup(family hx of VTE)  Age appropriate malignancy work-up  F/u as outpt in 4-6 weeks with dr martinez  Check pulse ox on ambulation(patient do have oxygen at home)  HOB elevation  recall as needed impression:      Acute PE sPESI 1(intermediate risk) major transient risk factor(recent hospitalisation/COVID).  recent SARS-COV-2 penumonia requiring hospitalisation.    Plan:    Continue xarelto for 3 months  will need hypercoag workup(family hx of VTE)  Age appropriate malignancy work-up  F/u as outpt in 4 -6 weeks   HOB elevation  recall as needed

## 2020-12-16 NOTE — DISCHARGE NOTE PROVIDER - CARE PROVIDER_API CALL
Luciano Baliey  [id number]  [address] [place] [address]  Phone: ([phone]  Fax: ()-  Established Patient  Follow Up Time: 1 week   Luciano Bailey  [id number]  [address] [place] [address]  Phone: ()-  Fax: ()-  Established Patient  Follow Up Time: 1 week    Bright Araiza)  Critical Care Medicine; Pulmonary Disease; Sleep Medicine  [address] [place] [address]  Phone: [phone]  Fax: [phone]  Established Patient  Follow Up Time: 1 week

## 2020-12-16 NOTE — DISCHARGE NOTE PROVIDER - INSTRUCTIONS
Eat more vegetables and fruits.  Swap refined grains for whole grains.  Choose fat-free or low-fat dairy products.  Choose lean protein sources like fish, poultry and beans.  Cook with vegetable oils.  Limit your intake of foods high in added sugars, like soda and candy.

## 2020-12-16 NOTE — DISCHARGE NOTE PROVIDER - CARE PROVIDERS DIRECT ADDRESSES
,[email or web address]_Unknown

## 2020-12-16 NOTE — DISCHARGE NOTE PROVIDER - HOSPITAL COURSE
56 Yo F with PMHx significant for recent COVID-19 and pneumonia discharged recently from [facility] (data not in HIE) presented with complain of shortness of breath which started acutely and was accompanied with chest pain midsternal in location and radiating to the right arm. Patient tested positive for COVID-19 on 11/15/2020 and was hospitalized for ten days, received steroids and another medication (patient is not sure if it was Remdesivir) and was sent home on home oxygen. Patient  was doing well until yesterday when she felt short of breath and developed non productive cough with chest pain radiating to right arm and back. Patient is not on any anticoagulation and was not sent home on any.  Patient denied any fevers, altered bowel habits, recent travels or headaches.     Patient was received vitally stable in ER. Blood work was significant for lymphocytopenia and elevated bilirubin. BNP and troponin were negative. CT angio chest showed acute bilateral lower lobe segmental and subsegmental pulmonary emboli with No right heart strain. Patchy bilateral ground glass opacities, likely postinfectious or postinflammatory in etiology and small right pleural effusion was seen. EKG showed sinus tachycardia with non specific ST changes. Patient was admitted for initiating anticoagulation and monitoring. By hospital day 2, ptn continued to develop intermittent fevers and pulmonary team was consulted. Patient had concurrent R-lower breast fungal skin infection, but due to recent PE cause of fever was unknown. Blood cultures were sent for w/u possible nosocomial infection (recent hospitalizqation for COVID).        56 Yo F with PMHx significant for recent COVID-19 and pneumonia discharged recently from [facility] (data not in HIE) presented with complain of shortness of breath which started acutely and was accompanied with chest pain midsternal in location and radiating to the right arm. Patient tested positive for COVID-19 on 11/15/2020 and was hospitalized for ten days, received steroids and another medication (patient is not sure if it was Remdesivir) and was sent home on home oxygen. Patient  was doing well until yesterday when she felt short of breath and developed non productive cough with chest pain radiating to right arm and back. Patient is not on any anticoagulation and was not sent home on any.  Patient denied any fevers, altered bowel habits, recent travels or headaches.     Patient was received vitally stable in ER. Blood work was significant for lymphocytopenia and elevated bilirubin. BNP and troponin were negative. CT angio chest showed acute bilateral lower lobe segmental and subsegmental pulmonary emboli with No right heart strain. Patchy bilateral ground glass opacities, likely postinfectious or postinflammatory in etiology and small right pleural effusion was seen. EKG showed sinus tachycardia with non specific ST changes. Patient was admitted for initiating anticoagulation and monitoring. By hospital day 2, ptn continued to develop intermittent fevers and pulmonary team was consulted. Patient had concurrent R-lower breast fungal skin infection, but due to recent PE cause of fever was unknown. Blood cultures were sent for w/u possible nosocomial infection (recent hospitalizqation for COVID). Returned negaive. Patient cleared for discharge by pulm and medial teams. Can follow up with pulm team outpatient in 4-6 weeks. Will continue on Xarelto on discharge, 3 week loading dose followed by 15mg per day maintenance dose.

## 2020-12-16 NOTE — DISCHARGE NOTE PROVIDER - NSDCCPCAREPLAN_GEN_ALL_CORE_FT
PRINCIPAL DISCHARGE DIAGNOSIS  Diagnosis: Pulmonary emboli  Assessment and Plan of Treatment: During this hospitalization, you were diagnosed with a pulmonary embolsim. In your case, you have a  deep vein thrombosis (DVT) which is a blood clot in a large vein deep in a leg, arm, or elsewhere in the body. The clot can separate from the vein, travel to the lungs and cut off blood flow. This is a pulmonary embolism (PE). Pulmonary embolism is very serious. Both the prevention and the treatment are similar for DVT and PE.   To help prevent more blood clots from forming, please see your primary care doctor within one week of discharge, and please take your medicines exactly as instructed. Don’t skip doses. You have been prescribed a medication to thin the blood, so that more clots do not form.  Have all lab tests as recommended. This is very important when you take medicines to prevent blood clots. Make sure you stay active and walk for at least 30 minutes every day. When sitting for long periods of time, move your knees, ankles, feet, and toes.    If you smoke, get help to quit. Stay at a healthy weight. Try to exercise at least 30 minutes on most days. Before starting an exercise program, talk with your primary care provider. When traveling by car, make frequent stops to get up and move around. On long airplane rides, get up and move around when possible. If you can’t get up, wiggle your toes, move your ankles and tighten your calves to keep your blood moving.  Seek immediate medical care if you have pain, swelling, and redness in your leg, arm, or other body area. These symptoms may mean another blood clot. Also call your healthcare provider if you have signs and symptoms of bleeding, like blood in your urine, bleeding with bowel movements, or bleeding from the nose, gums, a cut, or vagina. Call 911 if you have symptoms of a blood clot in the lungs including: Chest pain, trouble breathing, coughing blood, fast heartbeat, heavy or uncontrolled bleeding.

## 2020-12-16 NOTE — PROGRESS NOTE ADULT - SUBJECTIVE AND OBJECTIVE BOX
KAREL CORTÉS 55y Female  MRN#: [id number]       SUBJECTIVE  Patient is a 55y old Female who presents with a chief complaint of Shortness of breath (15 Dec 2020 16:38)  Currently admitted to medicine with the primary diagnosis of bilateral pulmonary emboli secondary to COVID infection 11/15. Pt seen and examined at bedside. No acute events overnight. Denies any ongoing chest pain/ SOB / discomfort. She was started on lovenox 1mg/kg SQ yesterday and switched to xarelto 15mg PO BID for 3 weeks today. She developed a fever yesterday afternoon, Tmax 101.7, now 100.3 after tylenol. Complaining of R-breast rash, present since before admission treated at home by pt's daughter. Itchy, red rash pt describes as slowly improving over the last 1 week. Otherwise no complaints.    Present Today:           Brown Catheter (x)No/ ()Yes? Indication:          Central Line (x)No/ ()Yes? Indication:          IV Fluids (x)No/ ()Yes? Type:  Rate:  Indication:      OBJECTIVE  PAST MEDICAL & SURGICAL HISTORY  COVID-19      ALLERGIES:  Allergy Status Unknown    MEDICATIONS:  STANDING MEDICATIONS  chlorhexidine 4% Liquid 1 Application(s) Topical <User Schedule>  clotrimazole 1% Cream 1 Application(s) Topical two times a day  famotidine    Tablet 20 milliGRAM(s) Oral daily  guaiFENesin  milliGRAM(s) Oral every 12 hours  influenza   Vaccine 0.5 milliLiter(s) IntraMuscular once  rivaroxaban 15 milliGRAM(s) Oral two times a day with meals    PRN MEDICATIONS  acetaminophen   Tablet .. 650 milliGRAM(s) Oral every 6 hours PRN      VITAL SIGNS: Last 24 Hours  T(C): 38 (16 Dec 2020 08:10), Max: 38.7 (15 Dec 2020 20:28)  T(F): 100.4 (16 Dec 2020 08:10), Max: 101.7 (15 Dec 2020 20:28)  HR: 110 (16 Dec 2020 05:16) (110 - 121)  BP: 117/59 (16 Dec 2020 05:16) (117/59 - 131/60)  BP(mean): --  RR: 18 (16 Dec 2020 05:16) (18 - 18)  SpO2: --    LABS:                        11.7   7.59  )-----------( 154      ( 15 Dec 2020 04:30 )             35.3     12-15    137  |  100  |  7<L>  ----------------------------<  95  4.3   |  27  |  0.7    Ca    9.1      15 Dec 2020 04:30  Mg     1.9     12-15    TPro  6.1  /  Alb  3.3<L>  /  TBili  2.7<H>  /  DBili  x   /  AST  13  /  ALT  19  /  AlkPhos  69  12-15    PT/INR - ( 15 Dec 2020 04:30 )   PT: 15.50 sec;   INR: 1.35 ratio                   CARDIAC MARKERS ( 15 Dec 2020 04:30 )  x     / <0.01 ng/mL / x     / x     / x      CARDIAC MARKERS ( 14 Dec 2020 17:00 )  x     / <0.01 ng/mL / x     / x     / x          RADIOLOGY:    EXAM:  CT ANGIO CHEST PE PROTOCOL IC            PROCEDURE DATE:  12/14/2020            INTERPRETATION:  CLINICAL INDICATION: Pulmonary embolism    TECHNIQUE:  CTA of the thorax was performed after administration of contrast per the PE protocol. Sagittal and coronal reformats were performed as well as 3D reconstructions.  Intravenous contrast: 100 cc of Optiray 320    COMPARISON: None.    INTERPRETATION:    PULMONARY ARTERIES: Acute bilateral lower lobe segmental and subsegmental pulmonary emboli.    AIRWAYS/LUNGS/PLEURA: The central tracheobronchial tree is patent.  Patchy bilateral ground glass opacities. Right lung base atelectasis. There is a small right pleural effusion. There is no pneumothorax.    MEDIASTINUM: There are no enlarged mediastinal, hilar or axillary lymph nodes. The visualized portion of the thyroid gland is unremarkable.    HEART AND VESSELS: The heart is normal in size. No right heart strain. Normal size of the thoracic aorta There is no pericardial effusion.    UPPER ABDOMEN: Images of the upper abdomen demonstrate no abnormality.    BONES AND SOFT TISSUES: There are degenerative changes of the spine.  The soft tissues are unremarkable.    TUBES AND LINES: None.    IMPRESSION:    1. Acute bilateral lower lobe segmental and subsegmental pulmonary emboli. No right heart strain.    2. Patchy bilateral ground glass opacities, likely postinfectious or postinflammatory in etiology.    3. Small right pleural effusion.        Spoke with CAIT SNYDER on 12/14/2020 6:34PM with readback.              MARTHA SHIN MD; Attending Radiologist  This document has been electronically signed. Dec 14 2020  6:36PM      < from: [facility] Duplex Lower Ext Vein Scan, Bilat (12.15.20 @ 10:39) >  EXAM:  DUPLEX SCAN EXT VEINS LOWER BI            PROCEDURE DATE:  12/15/2020            INTERPRETATION:  CLINICAL INFORMATION The patient is a 55 year old female with leg swelling. A venous duplex examination was performed to evaluate the patient for deep venous thrombosis of the lower extremities.    The bilateral common femoral, greater saphenous, superficial femoral, popliteal and lesser saphenous veins were visualized with no evidence of deep venous thrombosis    All veins were fully compressible.  There was presence of spontaneous flow, augmentation with distal compression and phasicity.  Left popliteal and gastrocnemius veins appear to be thrombosed  The anterior tibial veins were  patent  the posterior tibial veins were patent  and peroneal veins were patent        Impression:    Left popliteal and gastrocnemius veins appear to be thrombosed  Right lower extremity free from thrombus    ICD-10: M79.89              LESTER LESLIE MD; Attending Vascular Surgery  This document has been electronically signed. Dec 15 2020  5:26PM    < end of copied text >        PHYSICAL EXAM:    GENERAL: NAD, well-developed, AAOx3  HEENT:  Atraumatic, Normocephalic. EOMI, PERRLA, conjunctiva and sclera clear, No JVD  PULMONARY: Clear to auscultation bilaterally; No wheeze. Mildly decreased breath sounds bilaterally.  CARDIOVASCULAR: Regular rate and rhythm; No murmurs, rubs, or gallops  GASTROINTESTINAL: Soft, Nontender, Nondistended; Bowel sounds present  MUSCULOSKELETAL:  2+ Peripheral Pulses, No clubbing, cyanosis, or edema  NEUROLOGY: non-focal  SKIN: No lesions. Erythematous rash under R breast.      ADMISSION SUMMARY  Patient is a 55y old Female who presents with a chief complaint of Shortness of breath (15 Dec 2020 16:38)  Currently admitted to medicine with the primary diagnosis of Pulmonary emboli.        ASSESSMENT & PLAN    #BILATERAL PULMONARY EMBOLI, likely provoked by COVID  - CTA showed bilateral segmental and subsegmental emboli  - started on lovenox 1mg/kg SQ yesterday and switched to xarelto 15mg PO BID for 3 weeks today  - pulmonology consult ordered yesterday      #FEVER, likely due to PEs  - Tmax 101.7 yesterday, now 100.3, normal WBC  - continue tylenol  - BCX and procalcitonin ordered        (x) Discussed Case and Plan with Medical Attending, Name: Jefry Jaime MD   KAREL CORTÉS 55y Female  MRN#: [id number]       SUBJECTIVE  Patient is a 55y old Female who presents with a chief complaint of Shortness of breath (15 Dec 2020 16:38)  Currently admitted to medicine with the primary diagnosis of bilateral pulmonary emboli secondary to COVID infection 11/15. Pt seen and examined at bedside. No acute events overnight. Denies any ongoing chest pain/ SOB / discomfort. She was started on lovenox 1mg/kg SQ yesterday and switched to xarelto 15mg PO BID for 3 weeks today. She developed a fever yesterday afternoon, Tmax 101.7, now 100.3 after tylenol. Complaining of R-breast rash, present since before admission treated at home by pt's daughter. Itchy, red rash pt describes as slowly improving over the last 1 week. Otherwise no complaints.    Present Today:           Brown Catheter (x)No/ ()Yes? Indication:          Central Line (x)No/ ()Yes? Indication:          IV Fluids (x)No/ ()Yes? Type:  Rate:  Indication:      OBJECTIVE  PAST MEDICAL & SURGICAL HISTORY  COVID-19      ALLERGIES:  Allergy Status Unknown    MEDICATIONS:  STANDING MEDICATIONS  chlorhexidine 4% Liquid 1 Application(s) Topical <User Schedule>  clotrimazole 1% Cream 1 Application(s) Topical two times a day  famotidine    Tablet 20 milliGRAM(s) Oral daily  guaiFENesin  milliGRAM(s) Oral every 12 hours  influenza   Vaccine 0.5 milliLiter(s) IntraMuscular once  rivaroxaban 15 milliGRAM(s) Oral two times a day with meals    PRN MEDICATIONS  acetaminophen   Tablet .. 650 milliGRAM(s) Oral every 6 hours PRN      VITAL SIGNS: Last 24 Hours  T(C): 38 (16 Dec 2020 08:10), Max: 38.7 (15 Dec 2020 20:28)  T(F): 100.4 (16 Dec 2020 08:10), Max: 101.7 (15 Dec 2020 20:28)  HR: 110 (16 Dec 2020 05:16) (110 - 121)  BP: 117/59 (16 Dec 2020 05:16) (117/59 - 131/60)  BP(mean): --  RR: 18 (16 Dec 2020 05:16) (18 - 18)  SpO2: --    LABS:                        11.7   7.59  )-----------( 154      ( 15 Dec 2020 04:30 )             35.3     12-15    137  |  100  |  7<L>  ----------------------------<  95  4.3   |  27  |  0.7    Ca    9.1      15 Dec 2020 04:30  Mg     1.9     12-15    TPro  6.1  /  Alb  3.3<L>  /  TBili  2.7<H>  /  DBili  x   /  AST  13  /  ALT  19  /  AlkPhos  69  12-15    PT/INR - ( 15 Dec 2020 04:30 )   PT: 15.50 sec;   INR: 1.35 ratio                   CARDIAC MARKERS ( 15 Dec 2020 04:30 )  x     / <0.01 ng/mL / x     / x     / x      CARDIAC MARKERS ( 14 Dec 2020 17:00 )  x     / <0.01 ng/mL / x     / x     / x          RADIOLOGY:    EXAM:  CT ANGIO CHEST PE PROTOCOL IC            PROCEDURE DATE:  12/14/2020            INTERPRETATION:  CLINICAL INDICATION: Pulmonary embolism    TECHNIQUE:  CTA of the thorax was performed after administration of contrast per the PE protocol. Sagittal and coronal reformats were performed as well as 3D reconstructions.  Intravenous contrast: 100 cc of Optiray 320    COMPARISON: None.    INTERPRETATION:    PULMONARY ARTERIES: Acute bilateral lower lobe segmental and subsegmental pulmonary emboli.    AIRWAYS/LUNGS/PLEURA: The central tracheobronchial tree is patent.  Patchy bilateral ground glass opacities. Right lung base atelectasis. There is a small right pleural effusion. There is no pneumothorax.    MEDIASTINUM: There are no enlarged mediastinal, hilar or axillary lymph nodes. The visualized portion of the thyroid gland is unremarkable.    HEART AND VESSELS: The heart is normal in size. No right heart strain. Normal size of the thoracic aorta There is no pericardial effusion.    UPPER ABDOMEN: Images of the upper abdomen demonstrate no abnormality.    BONES AND SOFT TISSUES: There are degenerative changes of the spine.  The soft tissues are unremarkable.    TUBES AND LINES: None.    IMPRESSION:    1. Acute bilateral lower lobe segmental and subsegmental pulmonary emboli. No right heart strain.    2. Patchy bilateral ground glass opacities, likely postinfectious or postinflammatory in etiology.    3. Small right pleural effusion.        Spoke with CAIT SNYDER on 12/14/2020 6:34PM with readback.              MARTHA SHIN MD; Attending Radiologist  This document has been electronically signed. Dec 14 2020  6:36PM      < from: [facility] Duplex Lower Ext Vein Scan, Bilat (12.15.20 @ 10:39) >  EXAM:  DUPLEX SCAN EXT VEINS LOWER BI            PROCEDURE DATE:  12/15/2020            INTERPRETATION:  CLINICAL INFORMATION The patient is a 55 year old female with leg swelling. A venous duplex examination was performed to evaluate the patient for deep venous thrombosis of the lower extremities.    The bilateral common femoral, greater saphenous, superficial femoral, popliteal and lesser saphenous veins were visualized with no evidence of deep venous thrombosis    All veins were fully compressible.  There was presence of spontaneous flow, augmentation with distal compression and phasicity.  Left popliteal and gastrocnemius veins appear to be thrombosed  The anterior tibial veins were  patent  the posterior tibial veins were patent  and peroneal veins were patent        Impression:    Left popliteal and gastrocnemius veins appear to be thrombosed  Right lower extremity free from thrombus    ICD-10: M79.89              LESTER LESLIE MD; Attending Vascular Surgery  This document has been electronically signed. Dec 15 2020  5:26PM    < end of copied text >        PHYSICAL EXAM:    GENERAL: NAD, well-developed, AAOx3  HEENT:  Atraumatic, Normocephalic. EOMI, PERRLA, conjunctiva and sclera clear, No JVD  PULMONARY: Clear to auscultation bilaterally; No wheeze. Mildly decreased breath sounds bilaterally.  CARDIOVASCULAR: Regular rate and rhythm; No murmurs, rubs, or gallops  GASTROINTESTINAL: Soft, Nontender, Nondistended; Bowel sounds present  MUSCULOSKELETAL:  2+ Peripheral Pulses, No clubbing, cyanosis, or edema  NEUROLOGY: non-focal  SKIN: No lesions. Erythematous rash under R breast.

## 2020-12-16 NOTE — DISCHARGE NOTE PROVIDER - PROVIDER TOKENS
PROVIDER:[TOKEN:[[id number]],FOLLOWUP:[1 week],ESTABLISHEDPATIENT:[T]] PROVIDER:[TOKEN:[[id number]],FOLLOWUP:[1 week],ESTABLISHEDPATIENT:[T]],PROVIDER:[TOKEN:[[id number]],FOLLOWUP:[1 week],ESTABLISHEDPATIENT:[T]]

## 2020-12-16 NOTE — PROGRESS NOTE ADULT - ASSESSMENT
55y old Female PMHx significant for recent Covid infection requirng hospitalization 11/15/2020-11/25/2020 discharged without prophylactic  anticoagulationvwho presents with a chief complaint of Shortness of breath (15 Dec 2020 16:38) admitted to medicine with the primary diagnosis of bilateral pulmonary emboli and LLE DVT likely provoked by Covid hypercoagulable state, admission complicated by development of fever likely secondary to pulmonary embolism vs nosocomial infection from prolonged hospitalization      #BILATERAL PULMONARY EMBOLI, likely provoked by COVID  - CTA showed bilateral segmental and subsegmental emboli  - started on lovenox 1mg/kg SQ yesterday and switched to xarelto 15mg PO BID for 3 weeks today  - pulmonology recommendations appreciated, c/w Xarelto anticoagulation 15mg BID starting dose now and maintenance dose 20 mg Q24H in 3 weeks  - Pulm f/u in 2 weeks (patient given details for appointment scheduling)    #RIGHT BREAST RASH #FUNGAL INFECTION ON ADMISSION  - Took Clotrimazole cream 1% at home for relief  - Restart in hospital   - Wash with water and wet-->dry cloth  - Blood cultures sent    #Left Lower Extremity DVT secondary to Covid hypercoagulable sequelae  -D-dimer 675 on admission  - Lower extremity dupplex on admission revealing Left Gastrocnemius  and Left Popliteal DVT  - Ptn remains asymptomatic at site of DVT: no erythema/ pain on movement / swelling/ pain on palpation  - c/w Xarelto AC    #FEVER, likely due to PEs vs hospital aquired infection from recent prolongued hospitalization  - Tmax 101.7 yesterday, now 100.3, normal WBC  - Tachycardic to low 100's over course of admission  - continue tylenol  - BCX and procalcitonin ordered-->follow up  - start empiric Cefepime and Vanc for broad spectrum Abx coverage and for empiric MRSA coverage (risk of superimposed S aureus infection at site of fungal rash)  - ID consult ordered--> follow up    #Misc  - DVT Prophylaxis: Xarelto  - GI Prophylaxis: Protonix  - Diet: DASH  - Activity: increase as tolerated  - Code Status: Full code  -Dispo: Pending workup of fevers          (x) Discussed Case and Plan with Medical Attending, Name: Jefry Jaime MD     55y old Female PMHx significant for recent Covid infection requirng hospitalization 11/15/2020-11/25/2020 discharged without prophylactic  anticoagulationvwho presents with a chief complaint of Shortness of breath (15 Dec 2020 16:38) admitted to medicine with the primary diagnosis of bilateral pulmonary emboli and LLE DVT likely provoked by Covid hypercoagulable state, admission complicated by development of fever likely secondary to pulmonary embolism vs nosocomial infection from prolonged hospitalization      #BILATERAL PULMONARY EMBOLI, likely provoked by COVID  - CTA showed bilateral segmental and subsegmental emboli  - started on lovenox 1mg/kg SQ yesterday and switched to xarelto 15mg PO BID for 3 weeks today  - pulmonology recommendations appreciated, c/w Xarelto anticoagulation 15mg BID starting dose now and maintenance dose 20 mg Q24H in 3 weeks  - Pulm f/u in 2 weeks (patient given details for appointment scheduling)    #RIGHT BREAST RASH #FUNGAL INFECTION ON ADMISSION  - Took Clotrimazole cream 1% at home for relief  - Restart in hospital   - Wash with water and wet-->dry cloth  - continue to monitor for resolution    #Left Lower Extremity DVT secondary to Covid hypercoagulable sequelae  -D-dimer 675 on admission  - Lower extremity dupplex on admission revealing Left Gastrocnemius  and Left Popliteal DVT  - Ptn remains asymptomatic at site of DVT: no erythema/ pain on movement / swelling/ pain on palpation  - c/w Xarelto AC    #FEVER, likely due to PEs vs hospital aquired infection from recent prolongued hospitalization  - Tmax 101.7 yesterday, now 100.3, normal WBC  - Tachycardic to low 100's over course of admission  - continue tylenol  - BCX and procalcitonin ordered-->follow up  - start empiric Cefepime and Vanc for broad spectrum Abx coverage and for empiric MRSA coverage (risk of superimposed S aureus infection at site of fungal rash)  - ID consult ordered--> follow up    #Misc  - DVT Prophylaxis: Xarelto  - GI Prophylaxis: Protonix  - Diet: DASH  - Activity: increase as tolerated  - Code Status: Full code  -Dispo: Pending workup of fevers          (x) Discussed Case and Plan with Medical Attending, Name: Jefry Jaime MD    Resident Attestation: I have read the above progress note and agree with the medical student's findings, assessment and plan of care. Medical student has discussed case with senior residents and attending involved in the patient's care and the above note reflects a collaborative plan. This note has been reviewed and edited by the medical resident as necessary:    EL THORNTON  PGY-3

## 2020-12-17 LAB
ALBUMIN SERPL ELPH-MCNC: 3.1 G/DL — LOW (ref 3.5–5.2)
ALP SERPL-CCNC: 74 U/L — SIGNIFICANT CHANGE UP (ref 30–115)
ALT FLD-CCNC: 16 U/L — SIGNIFICANT CHANGE UP (ref 0–41)
ANION GAP SERPL CALC-SCNC: 12 MMOL/L — SIGNIFICANT CHANGE UP (ref 7–14)
AST SERPL-CCNC: 14 U/L — SIGNIFICANT CHANGE UP (ref 0–41)
BASOPHILS # BLD AUTO: 0.03 K/UL — SIGNIFICANT CHANGE UP (ref 0–0.2)
BASOPHILS NFR BLD AUTO: 0.4 % — SIGNIFICANT CHANGE UP (ref 0–1)
BILIRUB SERPL-MCNC: 1.2 MG/DL — SIGNIFICANT CHANGE UP (ref 0.2–1.2)
BUN SERPL-MCNC: 8 MG/DL — LOW (ref 10–20)
CALCIUM SERPL-MCNC: 8.6 MG/DL — SIGNIFICANT CHANGE UP (ref 8.5–10.1)
CHLORIDE SERPL-SCNC: 97 MMOL/L — LOW (ref 98–110)
CO2 SERPL-SCNC: 25 MMOL/L — SIGNIFICANT CHANGE UP (ref 17–32)
CREAT SERPL-MCNC: 0.6 MG/DL — LOW (ref 0.7–1.5)
CULTURE RESULTS: SIGNIFICANT CHANGE UP
EOSINOPHIL # BLD AUTO: 0.05 K/UL — SIGNIFICANT CHANGE UP (ref 0–0.7)
EOSINOPHIL NFR BLD AUTO: 0.7 % — SIGNIFICANT CHANGE UP (ref 0–8)
GLUCOSE SERPL-MCNC: 133 MG/DL — HIGH (ref 70–99)
HCT VFR BLD CALC: 33 % — LOW (ref 37–47)
HGB BLD-MCNC: 10.7 G/DL — LOW (ref 12–16)
IMM GRANULOCYTES NFR BLD AUTO: 0.4 % — HIGH (ref 0.1–0.3)
LYMPHOCYTES # BLD AUTO: 1.19 K/UL — LOW (ref 1.2–3.4)
LYMPHOCYTES # BLD AUTO: 16.2 % — LOW (ref 20.5–51.1)
MAGNESIUM SERPL-MCNC: 1.8 MG/DL — SIGNIFICANT CHANGE UP (ref 1.8–2.4)
MCHC RBC-ENTMCNC: 30.2 PG — SIGNIFICANT CHANGE UP (ref 27–31)
MCHC RBC-ENTMCNC: 32.4 G/DL — SIGNIFICANT CHANGE UP (ref 32–37)
MCV RBC AUTO: 93.2 FL — SIGNIFICANT CHANGE UP (ref 81–99)
MONOCYTES # BLD AUTO: 0.56 K/UL — SIGNIFICANT CHANGE UP (ref 0.1–0.6)
MONOCYTES NFR BLD AUTO: 7.6 % — SIGNIFICANT CHANGE UP (ref 1.7–9.3)
NEUTROPHILS # BLD AUTO: 5.47 K/UL — SIGNIFICANT CHANGE UP (ref 1.4–6.5)
NEUTROPHILS NFR BLD AUTO: 74.7 % — SIGNIFICANT CHANGE UP (ref 42.2–75.2)
NRBC # BLD: 0 /100 WBCS — SIGNIFICANT CHANGE UP (ref 0–0)
PLATELET # BLD AUTO: 213 K/UL — SIGNIFICANT CHANGE UP (ref 130–400)
POTASSIUM SERPL-MCNC: 3.8 MMOL/L — SIGNIFICANT CHANGE UP (ref 3.5–5)
POTASSIUM SERPL-SCNC: 3.8 MMOL/L — SIGNIFICANT CHANGE UP (ref 3.5–5)
PROT SERPL-MCNC: 5.8 G/DL — LOW (ref 6–8)
RBC # BLD: 3.54 M/UL — LOW (ref 4.2–5.4)
RBC # FLD: 14.2 % — SIGNIFICANT CHANGE UP (ref 11.5–14.5)
SODIUM SERPL-SCNC: 134 MMOL/L — LOW (ref 135–146)
SPECIMEN SOURCE: SIGNIFICANT CHANGE UP
WBC # BLD: 7.33 K/UL — SIGNIFICANT CHANGE UP (ref 4.8–10.8)
WBC # FLD AUTO: 7.33 K/UL — SIGNIFICANT CHANGE UP (ref 4.8–10.8)

## 2020-12-17 PROCEDURE — 99233 SBSQ HOSP IP/OBS HIGH 50: CPT

## 2020-12-17 RX ORDER — LORATADINE 10 MG/1
10 TABLET ORAL DAILY
Refills: 0 | Status: DISCONTINUED | OUTPATIENT
Start: 2020-12-17 | End: 2020-12-18

## 2020-12-17 RX ADMIN — CEFEPIME 100 MILLIGRAM(S): 1 INJECTION, POWDER, FOR SOLUTION INTRAMUSCULAR; INTRAVENOUS at 06:16

## 2020-12-17 RX ADMIN — RIVAROXABAN 15 MILLIGRAM(S): KIT at 07:45

## 2020-12-17 RX ADMIN — Medication 650 MILLIGRAM(S): at 07:45

## 2020-12-17 RX ADMIN — RIVAROXABAN 15 MILLIGRAM(S): KIT at 17:35

## 2020-12-17 RX ADMIN — Medication 650 MILLIGRAM(S): at 21:00

## 2020-12-17 RX ADMIN — Medication 650 MILLIGRAM(S): at 07:44

## 2020-12-17 RX ADMIN — Medication 600 MILLIGRAM(S): at 05:07

## 2020-12-17 RX ADMIN — Medication 1 APPLICATION(S): at 05:14

## 2020-12-17 RX ADMIN — Medication 600 MILLIGRAM(S): at 17:35

## 2020-12-17 RX ADMIN — Medication 166.67 MILLIGRAM(S): at 05:07

## 2020-12-17 RX ADMIN — FAMOTIDINE 20 MILLIGRAM(S): 10 INJECTION INTRAVENOUS at 11:06

## 2020-12-17 RX ADMIN — Medication 1 APPLICATION(S): at 17:36

## 2020-12-17 RX ADMIN — LORATADINE 10 MILLIGRAM(S): 10 TABLET ORAL at 11:07

## 2020-12-17 RX ADMIN — CHLORHEXIDINE GLUCONATE 1 APPLICATION(S): 213 SOLUTION TOPICAL at 05:06

## 2020-12-17 NOTE — CONSULT NOTE ADULT - SUBJECTIVE AND OBJECTIVE BOX
Patient is a 55y old  Female who presents with a chief complaint of Shortness of breath (15 Dec 2020 16:38)      HPI:  56 Yo F with PMHx significant for recent COVID-19 and pneumonia discharged recently from [facility] (14 days hospital stay nov 15-28) discharged on ome oxygen(was off oxygen for the past 2 weeks), presented with complain of shortness of breath which started acutely and was accompanied with chest pain midsternal in location and radiating to the right arm.  Patient  was doing well until yesterday when she felt short of breath and developed non productive cough with chest pain radiating to right arm and back. Patient is not on any anticoagulation and was not sent home on any.  Patient denied any fevers, altered bowel habits, recent travels or headaches.   +ve family histiry of VTE(father), no personal history of cancer or VTE.  Vital Signs (24 Hrs):  T(C): 37.1 (12-14-20 @ 16:30), Max: 37.1 (12-14-20 @ 16:30)  HR: 120 (12-14-20 @ 16:30) (120 - 135)  BP: 139/72 (12-14-20 @ 16:30) (135/78 - 139/72)  RR: 18 (12-14-20 @ 16:30) (18 - 20)  SpO2: 98% (12-14-20 @ 16:30) (97% - 98%)    Patient was received vitally stable in ER. Blood work was significant for lymphocytopenia and elevated bilirubin. BNP and troponin were negative. CT angio chest showed acute bilateral lower lobe segmental and subsegmental pulmonary emboli with No right heart strain. Patchy bilateral ground glass opacities, likely postinfectious or postinflammatory in etiology and small right pleural effusion was seen. EKG showed sinus tachycardia with non specific ST changes. Patient was admitted for initiating anticoagulation and monitoring.  (14 Dec 2020 20:16)      PAST MEDICAL & SURGICAL HISTORY:  COVID-19        SOCIAL HX:   Smoking -ve                        ETOH  -ve                          Other  -ve    FAMILY HISTORY:  .  No cardiovascular or pulmonary family history     REVIEW OF SYSTEMS:    CONSTITUTIONAL:   no fever   no chills.  no weight gain   no weight loss    EYES:   no discharge,   no visual changes.    ENT:   Ears: no ear pain and no hearing problems.  Nose: no nasal congestion and no nasal drainage.  Mouth/Throat: no dysphagia,  no hoarseness and no throat pain.  Neck: no lumps, no pain, no stiffness and no swollen glands.    CARDIOVASCULAR:   +ve pleuritic chest pain,   no swelling in LE   no palpitaions  no syncope    RESPIRATORY:  no SOB,  no wheezing ,  +ve Chest pain  no sputum production    GASTROINTESTINAL:   no abdominal pain,   no constipation,   no diarrhea,   no vomiting.    GENITOURINARY:  no dysuria,   no hematuria.    MUSCULOSKELETAL:   no back pain,   no musculoskeletal pain,  no weakness.    SKIN:   no jaundice,    no rashes.    NEURO:   no loss of consciousness,   no headache,   no weakness.    PSYCHIATRIC:   no known mental health issues  no anxiety  no depression    ALLERGIC/IMMUNOLOGIC:   No active allergic or immunologic issues      Allergies    Allergy Status Unknown    Intolerances          PHYSICAL EXAM  Vital Signs Last 24 Hrs  T(C): 38 (16 Dec 2020 08:10), Max: 38.7 (15 Dec 2020 20:28)  T(F): 100.4 (16 Dec 2020 08:10), Max: 101.7 (15 Dec 2020 20:28)  HR: 110 (16 Dec 2020 05:16) (110 - 121)  BP: 117/59 (16 Dec 2020 05:16) (117/59 - 131/60)  RR: 18 (16 Dec 2020 05:16) (18 - 18)  SpO2: 98% on ra    CONSTITUTIONAL:  Well nourished.  NAD    ENT:   Airway patent,   No thrush    EYES:   Clear bilaterally,   pupils equal,   round and reactive to light.    CARDIAC:   tachycardia  regular rhythm.    no edema      CAROTID:   normal systolic impulse  no bruits    RESPIRATORY:   No wheezing  Not tachypneic,  No use of accessory muscles    GASTROINTESTINAL:  Abdomen soft,   non-tender,   no guarding,   + BS    GENITOURINARY  normal genitalia for sex  no edema    MUSCULOSKELETAL:   range of motion is not limited,  no clubbing, cyanosis    NEUROLOGICAL:   Alert and oriented   no motor deficits.  pertinent DTRs normal    SKIN:   Skin normal color for race,   No evidence of rash.    PSYCHIATRIC:   normal mood and affect.   no apparent risk to self or others.    HEME LYMPH:   No cervical  lymphadenopathy.  no inguinal lymphadenopathy          LABS:                          11.7   7.59  )-----------( 154      ( 15 Dec 2020 04:30 )             35.3                                               12-15    137  |  100  |  7<L>  ----------------------------<  95  4.3   |  27  |  0.7    Ca    9.1      15 Dec 2020 04:30  Mg     1.9     12-15    TPro  6.1  /  Alb  3.3<L>  /  TBili  2.7<H>  /  DBili  x   /  AST  13  /  ALT  19  /  AlkPhos  69  12-15      PT/INR - ( 15 Dec 2020 04:30 )   PT: 15.50 sec;   INR: 1.35 ratio                                                    CARDIAC MARKERS ( 15 Dec 2020 04:30 )  x     / <0.01 ng/mL / x     / x     / x      CARDIAC MARKERS ( 14 Dec 2020 17:00 )  x     / <0.01 ng/mL / x     / x     / x                                                LIVER FUNCTIONS - ( 15 Dec 2020 04:30 )  Alb: 3.3 g/dL / Pro: 6.1 g/dL / ALK PHOS: 69 U/L / ALT: 19 U/L / AST: 13 U/L / GGT: x                                                                                                MEDICATIONS  (STANDING):  chlorhexidine 4% Liquid 1 Application(s) Topical <User Schedule>  clotrimazole 1% Cream 1 Application(s) Topical two times a day  famotidine    Tablet 20 milliGRAM(s) Oral daily  guaiFENesin  milliGRAM(s) Oral every 12 hours  influenza   Vaccine 0.5 milliLiter(s) IntraMuscular once  rivaroxaban 15 milliGRAM(s) Oral two times a day with meals    MEDICATIONS  (PRN):  acetaminophen   Tablet .. 650 milliGRAM(s) Oral every 6 hours PRN Temp greater or equal to 38C (100.4F)      X-Rays reviewed:    < from: CT Angio Chest PE Protocol w/ IV Cont (12.14.20 @ 18:14) >    EXAM:  CT ANGIO CHEST PE PROTOCOL IC            PROCEDURE DATE:  12/14/2020            INTERPRETATION:  CLINICAL INDICATION: Pulmonary embolism    TECHNIQUE:  CTA of the thorax was performed after administration of contrast per the PE protocol. Sagittal and coronal reformats were performed as well as 3D reconstructions.  Intravenous contrast: 100 cc of Optiray 320    COMPARISON: None.    INTERPRETATION:    PULMONARY ARTERIES: Acute bilateral lower lobe segmental and subsegmental pulmonary emboli.    AIRWAYS/LUNGS/PLEURA: The central tracheobronchial tree is patent.  Patchy bilateral ground glass opacities. Right lung base atelectasis. There is a small right pleural effusion. There is no pneumothorax.    MEDIASTINUM: There are no enlarged mediastinal, hilar or axillary lymph nodes. The visualized portion of the thyroid gland is unremarkable.    HEART AND VESSELS: The heart is normal in size. No right heart strain. Normal size of the thoracic aorta There is no pericardial effusion.    UPPER ABDOMEN: Images of the upper abdomen demonstrate no abnormality.    BONES AND SOFT TISSUES: There are degenerative changes of the spine.  The soft tissues are unremarkable.    TUBES AND LINES: None.    IMPRESSION:    1. Acute bilateral lower lobe segmental and subsegmental pulmonary emboli. No right heart strain.    2. Patchy bilateral ground glass opacities, likely postinfectious or postinflammatory in etiology.    3. Small right pleural effusion.        Spoke with CAIT SNYDER on 12/14/2020 6:34PM with readback.              MARTHA SHIN MD; Attending Radiologist  This document has been electronically signed. Dec 14 2020  6:36PM    < end of copied text >  < from: [facility] Duplex Lower Ext Vein Scan, Bildamion (12.15.20 @ 10:39) >  EXAM:  DUPLEX SCAN EXT VEINS LOWER BI            PROCEDURE DATE:  12/15/2020            INTERPRETATION:  CLINICAL INFORMATION The patient is a 55 year old female with leg swelling. A venous duplex examination was performed to evaluate the patient for deep venous thrombosis of the lower extremities.    The bilateral common femoral, greater saphenous, superficial femoral, popliteal and lesser saphenous veins were visualized with no evidence of deep venous thrombosis    All veins were fully compressible.  There was presence of spontaneous flow, augmentation with distal compression and phasicity.  Left popliteal and gastrocnemius veins appear to be thrombosed  The anterior tibial veins were  patent  the posterior tibial veins were patent  and peroneal veins were patent        Impression:    Left popliteal and gastrocnemius veins appear to be thrombosed  Right lower extremity free from thrombus    ICD-10: M79.89              LESTER LESLIE MD; Attending Vascular Surgery  This document has been electronically signed. Dec 15 2020  5:26PM    < end of copied text >  
  KAREL CORTÉS  55y, Female  Allergy: Allergy Status Unknown      All historical available data reviewed.    HPI:  54 Yo F with PMHx significant for recent COVID-19 and pneumonia discharged recently from [facility] (data not in HIE) presented with complain of shortness of breath which started acutely and was accompanied with chest pain midsternal in location and radiating to the right arm. Patient tested positive for COVID-19 on 11/15/2020 and was hospitalized for ten days, received steroids and another medication (patient is not sure if it was Remdesivir) and was sent home on home oxygen. Patient  was doing well until yesterday when she felt short of breath and developed non productive cough with chest pain radiating to right arm and back. Patient is not on any anticoagulation and was not sent home on any.  Patient denied any fevers, altered bowel habits, recent travels or headaches.     Vital Signs (24 Hrs):  T(C): 37.1 (20 @ 16:30), Max: 37.1 (20 @ 16:30)  HR: 120 (20 @ 16:30) (120 - 135)  BP: 139/72 (20 @ 16:30) (135/78 - 139/72)  RR: 18 (20 @ 16:30) (18 - 20)  SpO2: 98% (20 @ 16:30) (97% - 98%)    Patient was received vitally stable in ER. Blood work was significant for lymphocytopenia and elevated bilirubin. BNP and troponin were negative. CT angio chest showed acute bilateral lower lobe segmental and subsegmental pulmonary emboli with No right heart strain. Patchy bilateral ground glass opacities, likely postinfectious or postinflammatory in etiology and small right pleural effusion was seen. EKG showed sinus tachycardia with non specific ST changes. Patient was admitted for initiating anticoagulation and monitoring.  (14 Dec 2020 20:16)    FAMILY HISTORY:    PAST MEDICAL & SURGICAL HISTORY:  COVID-19          VITALS:  T(F): 98.6, Max: 101 (20 @ 17:21)  HR: 107  BP: 118/70  RR: 18Vital Signs Last 24 Hrs  T(C): 37 (17 Dec 2020 13:30), Max: 38.3 (16 Dec 2020 17:21)  T(F): 98.6 (17 Dec 2020 13:30), Max: 101 (16 Dec 2020 17:21)  HR: 107 (17 Dec 2020 13:30) (101 - 118)  BP: 118/70 (17 Dec 2020 13:30) (101/64 - 118/70)  BP(mean): --  RR: 18 (17 Dec 2020 13:30) (16 - 18)  SpO2: 98% (16 Dec 2020 14:53) (98% - 98%)    TESTS & MEASUREMENTS:                        10.7   7.33  )-----------( 213      ( 17 Dec 2020 08:17 )             33.0     12    134<L>  |  97<L>  |  8<L>  ----------------------------<  133<H>  3.8   |  25  |  0.6<L>    Ca    8.6      17 Dec 2020 08:17  Mg     1.8         TPro  5.8<L>  /  Alb  3.1<L>  /  TBili  1.2  /  DBili  x   /  AST  14  /  ALT  16  /  AlkPhos  74  12-17    LIVER FUNCTIONS - ( 17 Dec 2020 08:17 )  Alb: 3.1 g/dL / Pro: 5.8 g/dL / ALK PHOS: 74 U/L / ALT: 16 U/L / AST: 14 U/L / GGT: x             Culture - Blood (collected 20 @ 01:44)  Source: .Blood None  Preliminary Report (20 @ 07:02):    No growth to date.    Culture - Blood (collected 20 @ 01:44)  Source: .Blood None  Preliminary Report (20 @ 07:02):    No growth to date.      Urinalysis Basic - ( 16 Dec 2020 17:56 )    Color: Yellow / Appearance: Clear / S.014 / pH: x  Gluc: x / Ketone: Negative  / Bili: Negative / Urobili: <2 mg/dL   Blood: x / Protein: Trace / Nitrite: Positive   Leuk Esterase: Small / RBC: 2 /HPF / WBC 16 /HPF   Sq Epi: x / Non Sq Epi: 2 /HPF / Bacteria: Moderate          RADIOLOGY & ADDITIONAL TESTS:  Personal review of radiological diagnostics performed  Echo and EKG results noted when applicable.     MEDICATIONS:  acetaminophen   Tablet .. 650 milliGRAM(s) Oral every 6 hours PRN  chlorhexidine 4% Liquid 1 Application(s) Topical <User Schedule>  clotrimazole 1% Cream 1 Application(s) Topical two times a day  famotidine    Tablet 20 milliGRAM(s) Oral daily  guaiFENesin  milliGRAM(s) Oral every 12 hours  influenza   Vaccine 0.5 milliLiter(s) IntraMuscular once  loratadine 10 milliGRAM(s) Oral daily  rivaroxaban 15 milliGRAM(s) Oral two times a day with meals      ANTIBIOTICS:

## 2020-12-17 NOTE — PROGRESS NOTE ADULT - ASSESSMENT
56 Yo F with PMHx significant for recent COVID-19 and pneumonia discharged recently from [facility] (data not in HIE) presented with complain of shortness of breath which started acutely and was accompanied with chest pain midsternal in location and radiating to the right arm. Patient tested positive for COVID-19 on 11/15/2020 and was hospitalized for ten days. Now admitted with acute PE. Hospital course complicated by fevers.    #Acute PE/DVT  O2 sat 98% on room air  CT Angio (12/14): Acute b/l LL segmental and subsegmental pulmonary emboli. Patchy b/l ground glass opacities  LE Duplex (12/15): Left popliteal and gastrocnemius veins appear to be thrombosed.  - Cont rivoraxaban  - Outpt f/u for hypercoagulable workup    #Fevers, likely due to PE and DVT  Per ID, unlikely infectious etiology  - Monitor off abx  - DC once afebrile 24h    #Rt breast fungal rash  - Cont clotrimazole cream    DVT PPX, on xarelto

## 2020-12-17 NOTE — CONSULT NOTE ADULT - ASSESSMENT
56 Yo F with PMHx significant for recent COVID-19 and pneumonia discharged recently from [facility] (data not in HIE) presented with complain of shortness of breath which started acutely and was accompanied with chest pain midsternal in location and radiating to the right arm. Patient tested positive for COVID-19 on 11/15/2020 and was hospitalized for ten days    IMPRESSION;  COVID 19 with moderate illness. SpO2 >94% and not requiring supplemental O2.  Steroids not beneficial.  Pt is in the late inflammatory response phase ot the illness based on the onset of symptoms. ( Dx 11/5 )  unusal to have fevers this late into disease  See no superimposed bacterial PNA  CT with no focal consolidation. No PE  WBC 7.8  BCX 12/14 NG  COVID-19 NG 12/14    RECOMMENDATIONS;  No ABx

## 2020-12-17 NOTE — PROGRESS NOTE ADULT - SUBJECTIVE AND OBJECTIVE BOX
KAREL CORTÉS  55y, Female  Allergy: Allergy Status Unknown    Hospital Day: 3d    Patient seen and examined earlier today. No acute events overnight. febrile today morning 100.9    PMH/PSH:  PAST MEDICAL & SURGICAL HISTORY:  COVID-19    VITALS:  T(F): 98.6 (20 @ 13:30), Max: 101 (20 @ 17:21)  HR: 107 (20 @ 13:30)  BP: 118/70 (20 @ 13:30) (101/64 - 118/70)  RR: 18 (20 @ 13:30)  SpO2: 98% (20 @ 14:53)    TESTS & MEASUREMENTS:  Weight (Kg):   BMI (kg/m2): 38.9 (12-15)    12-15-20 @ 07:01  -  20 @ 07:00  --------------------------------------------------------  IN: 680 mL / OUT: 500 mL / NET: 180 mL    20 @ 07:01  -  20 @ 07:00  --------------------------------------------------------  IN: 780 mL / OUT: 0 mL / NET: 780 mL                        10.7   7.33  )-----------( 213      ( 17 Dec 2020 08:17 )             33.0         134<L>  |  97<L>  |  8<L>  ----------------------------<  133<H>  3.8   |  25  |  0.6<L>    Ca    8.6      17 Dec 2020 08:17  Mg     1.8         TPro  5.8<L>  /  Alb  3.1<L>  /  TBili  1.2  /  DBili  x   /  AST  14  /  ALT  16  /  AlkPhos  74      LIVER FUNCTIONS - ( 17 Dec 2020 08:17 )  Alb: 3.1 g/dL / Pro: 5.8 g/dL / ALK PHOS: 74 U/L / ALT: 16 U/L / AST: 14 U/L / GGT: x           Culture - Blood (collected 20 @ 01:44)  Source: .Blood None  Preliminary Report (20 @ 07:02):    No growth to date.    Culture - Blood (collected 20 @ 01:44)  Source: .Blood None  Preliminary Report (20 @ 07:02):    No growth to date.      Urinalysis Basic - ( 16 Dec 2020 17:56 )    Color: Yellow / Appearance: Clear / S.014 / pH: x  Gluc: x / Ketone: Negative  / Bili: Negative / Urobili: <2 mg/dL   Blood: x / Protein: Trace / Nitrite: Positive   Leuk Esterase: Small / RBC: 2 /HPF / WBC 16 /HPF   Sq Epi: x / Non Sq Epi: 2 /HPF / Bacteria: Moderate    RECENT DIAGNOSTIC ORDERS:  Magnesium, Serum: AM Sched. Collection: 18-Dec-2020 04:30 (20 @ 10:54)  Comprehensive Metabolic Panel: AM Sched. Collection: 18-Dec-2020 04:30 (20 @ 10:54)  Complete Blood Count + Automated Diff: AM Sched. Collection: 18-Dec-2020 04:30 (20 @ 10:54)  Culture - Urine: Routine  Specimen Source: Clean Catch (Midstream)  Addl Info: If indwelling urinary catheter > 14 days, obtain an order to remove and replace prior to c (20 @ 17:37)  Culture - Blood: 20:00  Specimen Source: Blood (20 @ 17:37)    MEDICATIONS:  MEDICATIONS  (STANDING):  chlorhexidine 4% Liquid 1 Application(s) Topical <User Schedule>  clotrimazole 1% Cream 1 Application(s) Topical two times a day  famotidine    Tablet 20 milliGRAM(s) Oral daily  guaiFENesin  milliGRAM(s) Oral every 12 hours  influenza   Vaccine 0.5 milliLiter(s) IntraMuscular once  loratadine 10 milliGRAM(s) Oral daily  rivaroxaban 15 milliGRAM(s) Oral two times a day with meals    MEDICATIONS  (PRN):  acetaminophen   Tablet .. 650 milliGRAM(s) Oral every 6 hours PRN Temp greater or equal to 38C (100.4F)      HOME MEDICATIONS:  clotrimazole 1% topical cream (-16)  Mucinex 600 mg oral tablet, extended release (-)  Pepcid 20 mg oral tablet (-)      REVIEW OF SYSTEMS:  All other review of systems is negative unless indicated above.     Physical Exam:  GENERAL: NAD, well-developed, AAOx3  HEENT:  Atraumatic, Normocephalic. EOMI, PERRLA, conjunctiva and sclera clear, No JVD  PULMONARY: Clear to auscultation bilaterally; No wheeze. Mildly decreased breath sounds bilaterally.  CARDIOVASCULAR: Regular rate and rhythm; No murmurs, rubs, or gallops  GASTROINTESTINAL: Soft, Nontender, Nondistended; Bowel sounds present  MUSCULOSKELETAL:  2+ Peripheral Pulses, No clubbing, cyanosis, or edema  NEUROLOGY: non-focal  SKIN: No lesions. Erythematous rash under R breast.

## 2020-12-18 ENCOUNTER — TRANSCRIPTION ENCOUNTER (OUTPATIENT)
Age: 55
End: 2020-12-18

## 2020-12-18 VITALS
SYSTOLIC BLOOD PRESSURE: 124 MMHG | TEMPERATURE: 100 F | HEART RATE: 110 BPM | DIASTOLIC BLOOD PRESSURE: 67 MMHG | RESPIRATION RATE: 18 BRPM

## 2020-12-18 LAB
ALBUMIN SERPL ELPH-MCNC: 3.2 G/DL — LOW (ref 3.5–5.2)
ALP SERPL-CCNC: 74 U/L — SIGNIFICANT CHANGE UP (ref 30–115)
ALT FLD-CCNC: 19 U/L — SIGNIFICANT CHANGE UP (ref 0–41)
ANION GAP SERPL CALC-SCNC: 11 MMOL/L — SIGNIFICANT CHANGE UP (ref 7–14)
AST SERPL-CCNC: 15 U/L — SIGNIFICANT CHANGE UP (ref 0–41)
BASOPHILS # BLD AUTO: 0.02 K/UL — SIGNIFICANT CHANGE UP (ref 0–0.2)
BASOPHILS NFR BLD AUTO: 0.3 % — SIGNIFICANT CHANGE UP (ref 0–1)
BILIRUB SERPL-MCNC: 0.9 MG/DL — SIGNIFICANT CHANGE UP (ref 0.2–1.2)
BUN SERPL-MCNC: 8 MG/DL — LOW (ref 10–20)
CALCIUM SERPL-MCNC: 8.3 MG/DL — LOW (ref 8.5–10.1)
CHLORIDE SERPL-SCNC: 99 MMOL/L — SIGNIFICANT CHANGE UP (ref 98–110)
CO2 SERPL-SCNC: 25 MMOL/L — SIGNIFICANT CHANGE UP (ref 17–32)
CREAT SERPL-MCNC: 0.5 MG/DL — LOW (ref 0.7–1.5)
EOSINOPHIL # BLD AUTO: 0.09 K/UL — SIGNIFICANT CHANGE UP (ref 0–0.7)
EOSINOPHIL NFR BLD AUTO: 1.5 % — SIGNIFICANT CHANGE UP (ref 0–8)
GLUCOSE SERPL-MCNC: 94 MG/DL — SIGNIFICANT CHANGE UP (ref 70–99)
HCT VFR BLD CALC: 32.1 % — LOW (ref 37–47)
HGB BLD-MCNC: 10.6 G/DL — LOW (ref 12–16)
IMM GRANULOCYTES NFR BLD AUTO: 0.5 % — HIGH (ref 0.1–0.3)
LYMPHOCYTES # BLD AUTO: 1 K/UL — LOW (ref 1.2–3.4)
LYMPHOCYTES # BLD AUTO: 16.4 % — LOW (ref 20.5–51.1)
MAGNESIUM SERPL-MCNC: 2.1 MG/DL — SIGNIFICANT CHANGE UP (ref 1.8–2.4)
MCHC RBC-ENTMCNC: 30.4 PG — SIGNIFICANT CHANGE UP (ref 27–31)
MCHC RBC-ENTMCNC: 33 G/DL — SIGNIFICANT CHANGE UP (ref 32–37)
MCV RBC AUTO: 92 FL — SIGNIFICANT CHANGE UP (ref 81–99)
MONOCYTES # BLD AUTO: 0.45 K/UL — SIGNIFICANT CHANGE UP (ref 0.1–0.6)
MONOCYTES NFR BLD AUTO: 7.4 % — SIGNIFICANT CHANGE UP (ref 1.7–9.3)
NEUTROPHILS # BLD AUTO: 4.51 K/UL — SIGNIFICANT CHANGE UP (ref 1.4–6.5)
NEUTROPHILS NFR BLD AUTO: 73.9 % — SIGNIFICANT CHANGE UP (ref 42.2–75.2)
NRBC # BLD: 0 /100 WBCS — SIGNIFICANT CHANGE UP (ref 0–0)
PLATELET # BLD AUTO: 233 K/UL — SIGNIFICANT CHANGE UP (ref 130–400)
POTASSIUM SERPL-MCNC: 3.7 MMOL/L — SIGNIFICANT CHANGE UP (ref 3.5–5)
POTASSIUM SERPL-SCNC: 3.7 MMOL/L — SIGNIFICANT CHANGE UP (ref 3.5–5)
PROT SERPL-MCNC: 5.9 G/DL — LOW (ref 6–8)
RBC # BLD: 3.49 M/UL — LOW (ref 4.2–5.4)
RBC # FLD: 14.3 % — SIGNIFICANT CHANGE UP (ref 11.5–14.5)
SODIUM SERPL-SCNC: 135 MMOL/L — SIGNIFICANT CHANGE UP (ref 135–146)
WBC # BLD: 6.1 K/UL — SIGNIFICANT CHANGE UP (ref 4.8–10.8)
WBC # FLD AUTO: 6.1 K/UL — SIGNIFICANT CHANGE UP (ref 4.8–10.8)

## 2020-12-18 PROCEDURE — 99233 SBSQ HOSP IP/OBS HIGH 50: CPT

## 2020-12-18 RX ORDER — LORATADINE 10 MG/1
1 TABLET ORAL
Qty: 30 | Refills: 0
Start: 2020-12-18 | End: 2021-01-16

## 2020-12-18 RX ORDER — FONDAPARINUX SODIUM 2.5 MG/.5ML
1 INJECTION, SOLUTION SUBCUTANEOUS
Qty: 38 | Refills: 0
Start: 2020-12-18

## 2020-12-18 RX ORDER — LORATADINE 10 MG/1
1 TABLET ORAL
Qty: 0 | Refills: 0 | DISCHARGE
Start: 2020-12-18

## 2020-12-18 RX ORDER — BUDESONIDE AND FORMOTEROL FUMARATE DIHYDRATE 160; 4.5 UG/1; UG/1
2 AEROSOL RESPIRATORY (INHALATION)
Qty: 1 | Refills: 0
Start: 2020-12-18

## 2020-12-18 RX ORDER — BUDESONIDE AND FORMOTEROL FUMARATE DIHYDRATE 160; 4.5 UG/1; UG/1
2 AEROSOL RESPIRATORY (INHALATION)
Refills: 0 | Status: DISCONTINUED | OUTPATIENT
Start: 2020-12-18 | End: 2020-12-18

## 2020-12-18 RX ORDER — FAMOTIDINE 10 MG/ML
1 INJECTION INTRAVENOUS
Qty: 30 | Refills: 0
Start: 2020-12-18 | End: 2021-01-16

## 2020-12-18 RX ADMIN — RIVAROXABAN 15 MILLIGRAM(S): KIT at 08:17

## 2020-12-18 RX ADMIN — FAMOTIDINE 20 MILLIGRAM(S): 10 INJECTION INTRAVENOUS at 11:02

## 2020-12-18 RX ADMIN — Medication 650 MILLIGRAM(S): at 08:50

## 2020-12-18 RX ADMIN — Medication 1 APPLICATION(S): at 05:14

## 2020-12-18 RX ADMIN — Medication 650 MILLIGRAM(S): at 08:20

## 2020-12-18 RX ADMIN — LORATADINE 10 MILLIGRAM(S): 10 TABLET ORAL at 11:02

## 2020-12-18 RX ADMIN — Medication 600 MILLIGRAM(S): at 05:14

## 2020-12-18 NOTE — PROGRESS NOTE ADULT - SUBJECTIVE AND OBJECTIVE BOX
KAREL CORTÉS  55y, Female    All available historical data reviewed    OVERNIGHT EVENTS:  isolated fevers  minimal cough    ROS:  General: Denies rigors, nightsweats  HEENT: Denies headache, rhinorrhea, sore throat, eye pain  CV: Denies CP, palpitations  PULM: Denies wheezing, hemoptysis  GI: Denies hematemesis, hematochezia, melena  : Denies discharge, hematuria  MSK: Denies arthralgias, myalgias  SKIN: Denies rash, lesions  NEURO: Denies paresthesias, weakness  PSYCH: Denies depression, anxiety    VITALS:  T(F): 100, Max: 101.3 (20 @ 20:59)  HR: 103  BP: 115/73  RR: 18Vital Signs Last 24 Hrs  T(C): 37.8 (18 Dec 2020 05:13), Max: 38.5 (17 Dec 2020 20:59)  T(F): 100 (18 Dec 2020 05:13), Max: 101.3 (17 Dec 2020 20:59)  HR: 103 (18 Dec 2020 05:13) (103 - 119)  BP: 115/73 (18 Dec 2020 05:13) (115/73 - 123/68)  BP(mean): --  RR: 18 (18 Dec 2020 05:13) (18 - 18)  SpO2: 96% (18 Dec 2020 07:40) (96% - 96%)    TESTS & MEASUREMENTS:                        10.6   6.10  )-----------( 233      ( 18 Dec 2020 06:10 )             32.1         135  |  99  |  8<L>  ----------------------------<  94  3.7   |  25  |  0.5<L>    Ca    8.3<L>      18 Dec 2020 06:10  Mg     2.1         TPro  5.9<L>  /  Alb  3.2<L>  /  TBili  0.9  /  DBili  x   /  AST  15  /  ALT  19  /  AlkPhos  74  12-18    LIVER FUNCTIONS - ( 18 Dec 2020 06:10 )  Alb: 3.2 g/dL / Pro: 5.9 g/dL / ALK PHOS: 74 U/L / ALT: 19 U/L / AST: 15 U/L / GGT: x             Culture - Blood (collected 20 @ 21:59)  Source: .Blood None  Preliminary Report (20 @ 04:02):    No growth to date.    Culture - Urine (collected 20 @ 17:56)  Source: .Urine Clean Catch (Midstream)  Final Report (20 @ 22:05):    >=3 organisms. Probable collection contamination.    Culture - Blood (collected 20 @ 01:44)  Source: .Blood None  Preliminary Report (20 @ 07:02):    No growth to date.    Culture - Blood (collected 20 @ 01:44)  Source: .Blood None  Preliminary Report (20 @ 07:02):    No growth to date.      Urinalysis Basic - ( 16 Dec 2020 17:56 )    Color: Yellow / Appearance: Clear / S.014 / pH: x  Gluc: x / Ketone: Negative  / Bili: Negative / Urobili: <2 mg/dL   Blood: x / Protein: Trace / Nitrite: Positive   Leuk Esterase: Small / RBC: 2 /HPF / WBC 16 /HPF   Sq Epi: x / Non Sq Epi: 2 /HPF / Bacteria: Moderate          RADIOLOGY & ADDITIONAL TESTS:  Personal review of radiological diagnostics performed  Echo and EKG results noted when applicable.     MEDICATIONS:  acetaminophen   Tablet .. 650 milliGRAM(s) Oral every 6 hours PRN  chlorhexidine 4% Liquid 1 Application(s) Topical <User Schedule>  clotrimazole 1% Cream 1 Application(s) Topical two times a day  famotidine    Tablet 20 milliGRAM(s) Oral daily  guaiFENesin  milliGRAM(s) Oral every 12 hours  influenza   Vaccine 0.5 milliLiter(s) IntraMuscular once  loratadine 10 milliGRAM(s) Oral daily  rivaroxaban 15 milliGRAM(s) Oral two times a day with meals      ANTIBIOTICS:

## 2020-12-18 NOTE — CHART NOTE - NSCHARTNOTEFT_GEN_A_CORE
<<<RESIDENT DISCHARGE NOTE>>>     KAREL CORTÉS  MRN-[id number]    VITAL SIGNS:  T(F): 99.9 (12-18-20 @ 12:34), Max: 101.3 (12-17-20 @ 20:59)  HR: 110 (12-18-20 @ 12:34)  BP: 124/67 (12-18-20 @ 12:34)  SpO2: 96% (12-18-20 @ 07:40)      PHYSICAL EXAMINATION:  General: Well appearing, no acute distress  Head & Neck: Normocephalic, atraumatic  Pulmonary: Lungs clear to auscultation bilaterally, no wheezing ronchi, rales  Cardiovascular: Regular rate, normal rhythm, S1&S2 auscultated  Gastrointestinal/Abdomen & Pelvis: Soft, nontender, nondistended, (+) bowel sounds  Neurologic/Motor: CN II-XII grossly intact, AAOx4    TEST RESULTS:                        10.6   6.10  )-----------( 233      ( 18 Dec 2020 06:10 )             32.1       12-18    135  |  99  |  8<L>  ----------------------------<  94  3.7   |  25  |  0.5<L>    Ca    8.3<L>      18 Dec 2020 06:10  Mg     2.1     12-18    TPro  5.9<L>  /  Alb  3.2<L>  /  TBili  0.9  /  DBili  x   /  AST  15  /  ALT  19  /  AlkPhos  74  12-18      FINAL DISCHARGE INTERVIEW:  Resident Present: DREW Paz MD    DISCHARGE MEDICATION RECONCILIATION  reviewed with Attending    DISPOSITION:  Home

## 2020-12-18 NOTE — PROGRESS NOTE ADULT - ASSESSMENT
54 Yo F with PMHx significant for recent COVID-19 and pneumonia discharged recently from [facility] (data not in HIE) presented with complain of shortness of breath which started acutely and was accompanied with chest pain midsternal in location and radiating to the right arm. Patient tested positive for COVID-19 on 11/15/2020 and was hospitalized for ten days    IMPRESSION;  Fevers related to PE. no PNA  COVID 19 with moderate illness. SpO2 >94% and not requiring supplemental O2.  Steroids not beneficial.  Pt is in the late inflammatory response phase ot the illness based on the onset of symptoms. ( Dx 11/5 )  unusal to have fevers this late into disease  See no superimposed bacterial PNA  CT with no focal consolidation. No PNA. B/l PE  WBC 6.1  BCX 12/14 , 16 NG  COVID-19 NG 12/14    RECOMMENDATIONS;  No ABx  Could discharge from ID standpoint  recall prn please

## 2020-12-18 NOTE — DISCHARGE NOTE NURSING/CASE MANAGEMENT/SOCIAL WORK - PATIENT PORTAL LINK FT
You can access the FollowMyHealth Patient Portal offered by [facility] by registering at the following website: [email or web address]. By joining [employer]’s FollowMyHealth portal, you will also be able to view your health information using other applications (apps) compatible with our system.

## 2020-12-18 NOTE — PROGRESS NOTE ADULT - ASSESSMENT
54 Yo F with PMHx significant for recent COVID-19 and pneumonia discharged recently from [facility] (data not in HIE) presented with complain of shortness of breath which started acutely and was accompanied with chest pain midsternal in location and radiating to the right arm. Patient tested positive for COVID-19 on 11/15/2020 and was hospitalized for ten days      Acute B/L PE post covid  Recent SARS-COV-2 pneumonia requiring hospitalization  Acute L popliteal vein DVT  Obesity  Fungal rash underneath breasts.   Fever likely due to PE/DVT          PLAN:    ·	Blood cxs x 3 negative.   ·	Care D/W the ID. Fever likely due to DVT/PE. Cleared the pt to D/C  ·	Pulmonary eval noted. Out pt F/U  ·	Cont Xarelto 15 mg po q 12h for 3 weeks, then 20 mg po daily  ·	ECHO showed EF is 58%. No RV strain  ·	Apply Clotrimazole cream to fungal rash  ·	Procalcitonin is 0.33  ·	D/C home.       * Med rec reviewed. Plan of care D/W the pt. Time spent 39 minutes.

## 2020-12-18 NOTE — PROGRESS NOTE ADULT - PROVIDER SPECIALTY LIST ADULT
Hospitalist
Hospitalist
Internal Medicine
Internal Medicine
Hospitalist
Hospitalist
Infectious Disease

## 2020-12-18 NOTE — PROGRESS NOTE ADULT - SUBJECTIVE AND OBJECTIVE BOX
KAREL CORTÉS  55y Female    CHIEF COMPLAINT:    Patient is a 55y old  Female who presents with a chief complaint of Shortness of breath (18 Dec 2020 11:48)      INTERVAL HPI/OVERNIGHT EVENTS:    Patient seen and examined. No fever today. Having some dry cough. No sob. No cp    ROS: All other systems are negative.    Vital Signs:    T(F): 100 (20 @ 05:13), Max: 101.3 (20 @ 20:59)  HR: 103 (20 @ 05:13) (103 - 119)  BP: 115/73 (20 @ 05:13) (115/73 - 123/68)  RR: 18 (20 @ 05:13) (18 - 18)  SpO2: 96% (20 @ 07:40) (96% - 96%)  I&O's Summary    17 Dec 2020 07:01  -  18 Dec 2020 07:00  --------------------------------------------------------  IN: 240 mL / OUT: 400 mL / NET: -160 mL      Daily     Daily Weight in k.9 (18 Dec 2020 05:13)  CAPILLARY BLOOD GLUCOSE          PHYSICAL EXAM:    GENERAL:  NAD  SKIN: No rashes or lesions  HENT: Atraumatic Normocephalic. PERRL. Moist membranes.  NECK: Supple, No JVD. No lymphadenopathy.  PULMONARY: CTA B/L. No wheezing. No rales  CVS: Normal S1, S2. Rate and Rhythm are regular. No murmurs.  ABDOMEN/GI: Soft, Nontender, Nondistended; BS present  EXTREMITIES: Peripheral pulses intact. No edema B/L LE.  NEUROLOGIC:  No motor or sensory deficit.  PSYCH: Alert & oriented x 3    Consultant(s) Notes Reviewed:  [x ] YES  [ ] NO  Care Discussed with Consultants/Other Providers [ x] YES  [ ] NO    EKG reviewed  Telemetry reviewed    LABS:                        10.6   6.10  )-----------( 233      ( 18 Dec 2020 06:10 )             32.1     12-    135  |  99  |  8<L>  ----------------------------<  94  3.7   |  25  |  0.5<L>    Ca    8.3<L>      18 Dec 2020 06:10  Mg     2.1         TPro  5.9<L>  /  Alb  3.2<L>  /  TBili  0.9  /  DBili  x   /  AST  15  /  ALT  19  /  AlkPhos  74  12      Serum Pro-Brain Natriuretic Peptide: 39 pg/mL (20 @ 17:00)        Culture - Blood (collected 16 Dec 2020 21:59)  Source: .Blood None  Preliminary Report (18 Dec 2020 04:02):    No growth to date.    Culture - Urine (collected 16 Dec 2020 17:56)  Source: .Urine Clean Catch (Midstream)  Final Report (17 Dec 2020 22:05):    >=3 organisms. Probable collection contamination.    Culture - Blood (collected 16 Dec 2020 01:44)  Source: .Blood None  Preliminary Report (17 Dec 2020 07:02):    No growth to date.    Culture - Blood (collected 16 Dec 2020 01:44)  Source: .Blood None  Preliminary Report (17 Dec 2020 07:02):    No growth to date.        RADIOLOGY & ADDITIONAL TESTS:      Imaging or report Personally Reviewed:  [ ] YES  [ ] NO    Medications:  Standing  chlorhexidine 4% Liquid 1 Application(s) Topical <User Schedule>  clotrimazole 1% Cream 1 Application(s) Topical two times a day  famotidine    Tablet 20 milliGRAM(s) Oral daily  guaiFENesin  milliGRAM(s) Oral every 12 hours  influenza   Vaccine 0.5 milliLiter(s) IntraMuscular once  loratadine 10 milliGRAM(s) Oral daily  rivaroxaban 15 milliGRAM(s) Oral two times a day with meals    PRN Meds  acetaminophen   Tablet .. 650 milliGRAM(s) Oral every 6 hours PRN      Case discussed with resident    Care discussed with pt/family

## 2020-12-21 ENCOUNTER — TRANSCRIPTION ENCOUNTER (OUTPATIENT)
Age: 55
End: 2020-12-21

## 2020-12-21 LAB
CULTURE RESULTS: SIGNIFICANT CHANGE UP
CULTURE RESULTS: SIGNIFICANT CHANGE UP
SPECIMEN SOURCE: SIGNIFICANT CHANGE UP
SPECIMEN SOURCE: SIGNIFICANT CHANGE UP

## 2020-12-22 DIAGNOSIS — B36.9 SUPERFICIAL MYCOSIS, UNSPECIFIED: ICD-10-CM

## 2020-12-22 DIAGNOSIS — D72.810 LYMPHOCYTOPENIA: ICD-10-CM

## 2020-12-22 DIAGNOSIS — I26.99 OTHER PULMONARY EMBOLISM WITHOUT ACUTE COR PULMONALE: ICD-10-CM

## 2020-12-22 DIAGNOSIS — K21.9 GASTRO-ESOPHAGEAL REFLUX DISEASE WITHOUT ESOPHAGITIS: ICD-10-CM

## 2020-12-22 DIAGNOSIS — E66.9 OBESITY, UNSPECIFIED: ICD-10-CM

## 2020-12-22 DIAGNOSIS — I82.462 ACUTE EMBOLISM AND THROMBOSIS OF LEFT CALF MUSCULAR VEIN: ICD-10-CM

## 2020-12-22 DIAGNOSIS — R00.0 TACHYCARDIA, UNSPECIFIED: ICD-10-CM

## 2020-12-22 DIAGNOSIS — Z83.2 FAMILY HISTORY OF DISEASES OF THE BLOOD AND BLOOD-FORMING ORGANS AND CERTAIN DISORDERS INVOLVING THE IMMUNE MECHANISM: ICD-10-CM

## 2020-12-22 DIAGNOSIS — E87.1 HYPO-OSMOLALITY AND HYPONATREMIA: ICD-10-CM

## 2020-12-22 DIAGNOSIS — I82.432 ACUTE EMBOLISM AND THROMBOSIS OF LEFT POPLITEAL VEIN: ICD-10-CM

## 2020-12-22 DIAGNOSIS — R50.9 FEVER, UNSPECIFIED: ICD-10-CM

## 2020-12-22 DIAGNOSIS — B94.8 SEQUELAE OF OTHER SPECIFIED INFECTIOUS AND PARASITIC DISEASES: ICD-10-CM

## 2020-12-22 LAB
CULTURE RESULTS: SIGNIFICANT CHANGE UP
SPECIMEN SOURCE: SIGNIFICANT CHANGE UP

## 2021-01-06 RX ORDER — RIVAROXABAN 15 MG-20MG
1 KIT ORAL
Qty: 30 | Refills: 0
Start: 2021-01-06 | End: 2021-02-04

## 2021-01-06 RX ORDER — RIVAROXABAN 15 MG-20MG
1 KIT ORAL
Qty: 30 | Refills: 2
Start: 2021-01-06 | End: 2021-04-05

## 2022-06-02 RX ORDER — FAMOTIDINE 10 MG/ML
1 INJECTION INTRAVENOUS
Qty: 0 | Refills: 0 | DISCHARGE

## 2022-09-04 ENCOUNTER — EMERGENCY (EMERGENCY)
Facility: HOSPITAL | Age: 57
LOS: 0 days | Discharge: HOME | End: 2022-09-05
Attending: EMERGENCY MEDICINE | Admitting: EMERGENCY MEDICINE

## 2022-09-04 VITALS
TEMPERATURE: 99 F | HEART RATE: 115 BPM | RESPIRATION RATE: 20 BRPM | HEIGHT: 71 IN | SYSTOLIC BLOOD PRESSURE: 144 MMHG | WEIGHT: 293 LBS | OXYGEN SATURATION: 97 % | DIASTOLIC BLOOD PRESSURE: 86 MMHG

## 2022-09-04 DIAGNOSIS — S50.312A ABRASION OF LEFT ELBOW, INITIAL ENCOUNTER: ICD-10-CM

## 2022-09-04 DIAGNOSIS — M54.50 LOW BACK PAIN, UNSPECIFIED: ICD-10-CM

## 2022-09-04 DIAGNOSIS — Z88.0 ALLERGY STATUS TO PENICILLIN: ICD-10-CM

## 2022-09-04 DIAGNOSIS — Z79.01 LONG TERM (CURRENT) USE OF ANTICOAGULANTS: ICD-10-CM

## 2022-09-04 DIAGNOSIS — Z23 ENCOUNTER FOR IMMUNIZATION: ICD-10-CM
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
## 2022-09-04 DIAGNOSIS — Z86.16 PERSONAL HISTORY OF COVID-19: ICD-10-CM

## 2022-09-04 PROCEDURE — 99284 EMERGENCY DEPT VISIT MOD MDM: CPT

## 2022-09-04 RX ORDER — METHOCARBAMOL 500 MG/1
1500 TABLET, FILM COATED ORAL ONCE
Refills: 0 | Status: COMPLETED | OUTPATIENT
Start: 2022-09-04 | End: 2022-09-04

## 2022-09-04 RX ORDER — TETANUS TOXOID, REDUCED DIPHTHERIA TOXOID AND ACELLULAR PERTUSSIS VACCINE, ADSORBED 5; 2.5; 8; 8; 2.5 [IU]/.5ML; [IU]/.5ML; UG/.5ML; UG/.5ML; UG/.5ML
0.5 SUSPENSION INTRAMUSCULAR ONCE
Refills: 0 | Status: COMPLETED | OUTPATIENT
Start: 2022-09-04 | End: 2022-09-04

## 2022-09-04 RX ORDER — METHOCARBAMOL 500 MG/1
2 TABLET, FILM COATED ORAL
Qty: 40 | Refills: 0
Start: 2022-09-04 | End: 2022-09-08

## 2022-09-04 RX ORDER — KETOROLAC TROMETHAMINE 30 MG/ML
30 SYRINGE (ML) INJECTION ONCE
Refills: 0 | Status: DISCONTINUED | OUTPATIENT
Start: 2022-09-04 | End: 2022-09-04
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
## 2022-09-05 VITALS — TEMPERATURE: 98 F | DIASTOLIC BLOOD PRESSURE: 65 MMHG | SYSTOLIC BLOOD PRESSURE: 126 MMHG | HEART RATE: 84 BPM

## 2022-09-05 PROBLEM — U07.1 COVID-19: Chronic | Status: ACTIVE | Noted: 2020-12-14

## 2022-09-05 RX ADMIN — METHOCARBAMOL 1500 MILLIGRAM(S): 500 TABLET, FILM COATED ORAL at 00:12

## 2022-09-05 RX ADMIN — TETANUS TOXOID, REDUCED DIPHTHERIA TOXOID AND ACELLULAR PERTUSSIS VACCINE, ADSORBED 0.5 MILLILITER(S): 5; 2.5; 8; 8; 2.5 SUSPENSION INTRAMUSCULAR at 00:14

## 2022-09-05 RX ADMIN — Medication 30 MILLIGRAM(S): at 00:13

## 2022-09-05 NOTE — ED PROVIDER NOTE - NSFOLLOWUPCLINICS_GEN_ALL_ED_FT
[facility] ([facility])  Rehabilitation  [facility] [address], [address] [place] [address]  Phone: [phone]  Fax:     [facility] ([facility])  Rehabilitation  [address] [place] [id number]  Phone: [phone]  Fax:

## 2022-09-05 NOTE — ED PROVIDER NOTE - NS ED ATTENDING STATEMENT MOD
This was a shared visit with the MIYA. I reviewed and verified the documentation and independently performed the documented:
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
## 2022-09-05 NOTE — ED PROVIDER NOTE - PATIENT PORTAL LINK FT
You can access the FollowMyHealth Patient Portal offered by [facility] by registering at the following website: [email or web address]. By joining [facility]’s FollowMyHealth portal, you will also be able to view your health information using other applications (apps) compatible with our system.

## 2022-09-05 NOTE — ED PROVIDER NOTE - NS ED ROS FT
Constitutional: (-) fever  Eyes/ENT: (-) blurry vision, (-) epistaxis  Cardiovascular: (-) chest pain, (-) syncope  Respiratory: (-) cough, (-) shortness of breath  Gastrointestinal: (-) vomiting, (-) diarrhea  : (-) incontinence, (-) dysuria, (-) hematuria   Musculoskeletal: (+) back pain, (-) neck pain,, (-) joint pain  Integumentary: (-) rash, (-) edema  Neurological: (-) headache, (-) altered mental status  Allergic/Immunologic: (-) pruritus

## 2022-09-05 NOTE — ED PROVIDER NOTE - PHYSICAL EXAMINATION
CONST: Well appearing in NAD  EYES: Sclera and conjunctiva clear.   ENT: No nasal discharge. Oropharynx normal appearing, no erythema or exudates. No abscess or swelling. Uvula midline.   NECK: Non-tender, no meningeal signs. normal ROM. supple   CARD: S1 S2; No jvd  RESP: Equal BS B/L, No wheezes, rhonchi or rales. No distress  GI: Soft, non-tender, non-distended. no cva tenderness. normal BS  MS: Reproducible lumbar paraspinal tenderness. Normal ROM in all extremities. pulses 2 +. no calf tenderness or swelling  SKIN: Superficial abrasion to L elbow  NEURO: A&Ox3, No focal deficits. Strength 5/5 with no sensory deficits. Steady gait.

## 2022-09-05 NOTE — ED PROVIDER NOTE - OBJECTIVE STATEMENT
56y F no ppmh presents for eval s/p fall. Pt was walking across the street when she fell backwards onto her buttocks and lower back, since has mild aching lower back pain, no aggravating or relieving factors. Pt sustained abrasion to L elbow. Ambulating at baseline since. Denies numbness, weakness, incontinence

## 2023-04-04 NOTE — PROGRESS NOTE ADULT - MS EXT PE MLT D E PC
What Type Of Note Output Would You Prefer (Optional)?: Bullet Format
What Is The Reason For Today's Visit?: Full Body Skin Examination
What Is The Reason For Today's Visit? (Being Monitored For X): the re-examination of lesions previously examined
no cyanosis

## 2023-06-01 NOTE — PATIENT PROFILE ADULT - FALL HARM RISK CONCLUSION
Fall Risk SSKI Counseling:  I discussed with the patient the risks of SSKI including but not limited to thyroid abnormalities, metallic taste, GI upset, fever, headache, acne, arthralgias, paraesthesias, lymphadenopathy, easy bleeding, arrhythmias, and allergic reaction.

## 2024-10-18 NOTE — PROGRESS NOTE ADULT - ASSESSMENT
56 Yo F with PMHx significant for recent COVID-19 and pneumonia discharged recently from [facility] (data not in HIE) presented with complain of shortness of breath which started acutely and was accompanied with chest pain midsternal in location and radiating to the right arm. Patient tested positive for COVID-19 on 11/15/2020 and was hospitalized for ten days      Acute B/L PE post covid  Recent SARS-COV-2 pneumonia requiring hospitalization  Acute L popliteal vein DVT  Obesity  Fungal rash underneath breasts.   Fever likely due to PE          PLAN:    ·	Pulmonary eval noted. Cleared to D/C home and F/U as an out pt.   ·	Cont Xarelto 15 mg po q 12h for 3 weeks, then 20 mg po daily  ·	ECHO showed EF is 58%. No RV strain  ·	Apply Clotrimazole cream to fungal rash  ·	D/C home    * Med rec reviewed. Plan of care D/W the pt. Time spent 39 minutes.    54 Yo F with PMHx significant for recent COVID-19 and pneumonia discharged recently from [facility] (data not in HIE) presented with complain of shortness of breath which started acutely and was accompanied with chest pain midsternal in location and radiating to the right arm. Patient tested positive for COVID-19 on 11/15/2020 and was hospitalized for ten days      Acute B/L PE post covid  Recent SARS-COV-2 pneumonia requiring hospitalization  Acute L popliteal vein DVT  Obesity  Fungal rash underneath breasts.   Fever likely due to PE          PLAN:    ·	Still spiking fever. Blood cxs sent. Will empirically start her on Vanco/Cefepime and call ID eval.   ·	Pulmonary eval noted. Out pt F/U  ·	Cont Xarelto 15 mg po q 12h for 3 weeks, then 20 mg po daily  ·	ECHO showed EF is 58%. No RV strain  ·	Apply Clotrimazole cream to fungal rash  ·	Check Procalcitonin.   ·	Once afebrile more than 24 hrs can D/C her home.       * Med rec reviewed. Plan of care D/W the pt. Time spent 39 minutes.    18